# Patient Record
Sex: MALE | Race: WHITE | Employment: OTHER | URBAN - METROPOLITAN AREA
[De-identification: names, ages, dates, MRNs, and addresses within clinical notes are randomized per-mention and may not be internally consistent; named-entity substitution may affect disease eponyms.]

---

## 2017-01-12 RX ORDER — OMEPRAZOLE 40 MG/1
40 CAPSULE, DELAYED RELEASE ORAL
Qty: 90 CAPSULE | Refills: 0 | Status: SHIPPED | OUTPATIENT
Start: 2017-01-12 | End: 2017-08-05

## 2017-01-26 RX ORDER — LISINOPRIL 5 MG/1
TABLET ORAL
Qty: 90 TABLET | Refills: 0 | Status: SHIPPED | OUTPATIENT
Start: 2017-01-26 | End: 2017-04-23

## 2017-02-24 RX ORDER — SIMVASTATIN 40 MG
TABLET ORAL
Qty: 90 TABLET | Refills: 0 | Status: SHIPPED | OUTPATIENT
Start: 2017-02-24 | End: 2017-11-27

## 2017-03-06 RX ORDER — ALLOPURINOL 100 MG/1
TABLET ORAL
Qty: 90 TABLET | Refills: 0 | Status: SHIPPED | OUTPATIENT
Start: 2017-03-06 | End: 2017-06-01

## 2017-03-15 ENCOUNTER — LAB ENCOUNTER (OUTPATIENT)
Dept: LAB | Age: 82
End: 2017-03-15
Attending: FAMILY MEDICINE
Payer: MEDICARE

## 2017-03-15 DIAGNOSIS — M10.00 IDIOPATHIC GOUT, UNSPECIFIED CHRONICITY, UNSPECIFIED SITE: ICD-10-CM

## 2017-03-15 DIAGNOSIS — Z79.4 CONTROLLED TYPE 2 DIABETES MELLITUS WITHOUT COMPLICATION, WITH LONG-TERM CURRENT USE OF INSULIN (HCC): ICD-10-CM

## 2017-03-15 DIAGNOSIS — E55.9 VITAMIN D DEFICIENCY: ICD-10-CM

## 2017-03-15 DIAGNOSIS — E11.9 CONTROLLED TYPE 2 DIABETES MELLITUS WITHOUT COMPLICATION, WITH LONG-TERM CURRENT USE OF INSULIN (HCC): ICD-10-CM

## 2017-03-15 DIAGNOSIS — E78.00 PURE HYPERCHOLESTEROLEMIA: ICD-10-CM

## 2017-03-15 LAB
25-HYDROXYVITAMIN D (TOTAL): 41.1 NG/ML (ref 30–100)
ALBUMIN SERPL-MCNC: 3.5 G/DL (ref 3.5–4.8)
ALP LIVER SERPL-CCNC: 76 U/L (ref 45–117)
ALT SERPL-CCNC: 8 U/L (ref 17–63)
AST SERPL-CCNC: 18 U/L (ref 15–41)
BASOPHILS # BLD AUTO: 0.09 X10(3) UL (ref 0–0.1)
BASOPHILS NFR BLD AUTO: 0.9 %
BILIRUB SERPL-MCNC: 0.5 MG/DL (ref 0.1–2)
BUN BLD-MCNC: 16 MG/DL (ref 8–20)
CALCIUM BLD-MCNC: 9 MG/DL (ref 8.3–10.3)
CHLORIDE: 107 MMOL/L (ref 101–111)
CHOLEST SMN-MCNC: 126 MG/DL (ref ?–200)
CO2: 27 MMOL/L (ref 22–32)
CREAT BLD-MCNC: 1.16 MG/DL (ref 0.7–1.3)
CREAT UR-SCNC: 132 MG/DL
EOSINOPHIL # BLD AUTO: 0.18 X10(3) UL (ref 0–0.3)
EOSINOPHIL NFR BLD AUTO: 1.9 %
ERYTHROCYTE [DISTWIDTH] IN BLOOD BY AUTOMATED COUNT: 13.3 % (ref 11.5–16)
EST. AVERAGE GLUCOSE BLD GHB EST-MCNC: 131 MG/DL (ref 68–126)
GLUCOSE BLD-MCNC: 151 MG/DL (ref 70–99)
HBA1C MFR BLD HPLC: 6.2 % (ref ?–5.7)
HCT VFR BLD AUTO: 40.3 % (ref 37–53)
HDLC SERPL-MCNC: 48 MG/DL (ref 45–?)
HDLC SERPL: 2.63 {RATIO} (ref ?–4.97)
HGB BLD-MCNC: 13.5 G/DL (ref 13–17)
IMMATURE GRANULOCYTE COUNT: 0.08 X10(3) UL (ref 0–1)
IMMATURE GRANULOCYTE RATIO %: 0.8 %
LDLC SERPL CALC-MCNC: 51 MG/DL (ref ?–130)
LYMPHOCYTES # BLD AUTO: 3.22 X10(3) UL (ref 0.9–4)
LYMPHOCYTES NFR BLD AUTO: 33.3 %
M PROTEIN MFR SERPL ELPH: 6.8 G/DL (ref 6.1–8.3)
MCH RBC QN AUTO: 33 PG (ref 27–33.2)
MCHC RBC AUTO-ENTMCNC: 33.5 G/DL (ref 31–37)
MCV RBC AUTO: 98.5 FL (ref 80–99)
MICROALBUMIN UR-MCNC: 2.86 MG/DL
MICROALBUMIN/CREAT 24H UR-RTO: 21.7 UG/MG (ref ?–30)
MONOCYTES # BLD AUTO: 0.68 X10(3) UL (ref 0.1–0.6)
MONOCYTES NFR BLD AUTO: 7 %
NEUTROPHIL ABS PRELIM: 5.43 X10 (3) UL (ref 1.3–6.7)
NEUTROPHILS # BLD AUTO: 5.43 X10(3) UL (ref 1.3–6.7)
NEUTROPHILS NFR BLD AUTO: 56.1 %
NONHDLC SERPL-MCNC: 78 MG/DL (ref ?–130)
PLATELET # BLD AUTO: 245 10(3)UL (ref 150–450)
POTASSIUM SERPL-SCNC: 4.4 MMOL/L (ref 3.6–5.1)
RBC # BLD AUTO: 4.09 X10(6)UL (ref 3.8–5.8)
RED CELL DISTRIBUTION WIDTH-SD: 47.7 FL (ref 35.1–46.3)
SODIUM SERPL-SCNC: 142 MMOL/L (ref 136–144)
TRIGLYCERIDES: 135 MG/DL (ref ?–150)
URIC ACID: 4.9 MG/DL (ref 2.4–8.7)
VLDL: 27 MG/DL (ref 5–40)
WBC # BLD AUTO: 9.7 X10(3) UL (ref 4–13)

## 2017-03-15 PROCEDURE — 36415 COLL VENOUS BLD VENIPUNCTURE: CPT

## 2017-03-15 PROCEDURE — 85025 COMPLETE CBC W/AUTO DIFF WBC: CPT

## 2017-03-15 PROCEDURE — 83036 HEMOGLOBIN GLYCOSYLATED A1C: CPT

## 2017-03-15 PROCEDURE — 80061 LIPID PANEL: CPT

## 2017-03-15 PROCEDURE — 82306 VITAMIN D 25 HYDROXY: CPT

## 2017-03-15 PROCEDURE — 80053 COMPREHEN METABOLIC PANEL: CPT

## 2017-03-15 PROCEDURE — 82570 ASSAY OF URINE CREATININE: CPT

## 2017-03-15 PROCEDURE — 82043 UR ALBUMIN QUANTITATIVE: CPT

## 2017-03-15 PROCEDURE — 84550 ASSAY OF BLOOD/URIC ACID: CPT

## 2017-03-22 ENCOUNTER — OFFICE VISIT (OUTPATIENT)
Dept: FAMILY MEDICINE CLINIC | Facility: CLINIC | Age: 82
End: 2017-03-22

## 2017-03-22 VITALS
BODY MASS INDEX: 25.62 KG/M2 | OXYGEN SATURATION: 98 % | HEIGHT: 70 IN | HEART RATE: 78 BPM | SYSTOLIC BLOOD PRESSURE: 120 MMHG | DIASTOLIC BLOOD PRESSURE: 58 MMHG | TEMPERATURE: 98 F | RESPIRATION RATE: 16 BRPM | WEIGHT: 179 LBS

## 2017-03-22 DIAGNOSIS — Z00.00 ENCOUNTER FOR ANNUAL HEALTH EXAMINATION: Primary | ICD-10-CM

## 2017-03-22 DIAGNOSIS — E78.00 PURE HYPERCHOLESTEROLEMIA: ICD-10-CM

## 2017-03-22 DIAGNOSIS — M10.00 IDIOPATHIC GOUT, UNSPECIFIED CHRONICITY, UNSPECIFIED SITE: ICD-10-CM

## 2017-03-22 DIAGNOSIS — E53.8 B12 DEFICIENCY: ICD-10-CM

## 2017-03-22 DIAGNOSIS — E11.49 DIABETES MELLITUS TYPE 2 WITH NEUROLOGICAL MANIFESTATIONS (HCC): ICD-10-CM

## 2017-03-22 DIAGNOSIS — E55.9 VITAMIN D DEFICIENCY: ICD-10-CM

## 2017-03-22 PROCEDURE — 99214 OFFICE O/P EST MOD 30 MIN: CPT | Performed by: FAMILY MEDICINE

## 2017-03-22 PROCEDURE — G0439 PPPS, SUBSEQ VISIT: HCPCS | Performed by: FAMILY MEDICINE

## 2017-03-22 PROCEDURE — G0444 DEPRESSION SCREEN ANNUAL: HCPCS | Performed by: FAMILY MEDICINE

## 2017-03-22 RX ORDER — CARBIDOPA AND LEVODOPA 50; 200 MG/1; MG/1
1 TABLET, EXTENDED RELEASE ORAL 3 TIMES DAILY
Qty: 90 TABLET | Refills: 0 | COMMUNITY
Start: 2017-03-22 | End: 2017-03-22 | Stop reason: CLARIF

## 2017-03-22 NOTE — PATIENT INSTRUCTIONS
Continue current meds. Watch diet for fats and carbs. Stay active. Do fasting blood work one week prior next visit.   Prince Hadley's SCREENING SCHEDULE   Tests on this list are recommended by your physician but may not be covered, or covered at [facility] visit.  Limited to patients who meet one of the following criteria:   • Men who are 73-68 years old and have smoked more than 100 cigarettes in their lifetime   • Anyone with a family history    Colorectal Cancer Screening Covered up to Age 76     Colonosco or IX concentrates   Clients of institutions for the mentally retarded   Persons who live in the same house as a HepB virus carrier   Homosexual men   Illicit injectable drug abusers     Tetanus Toxoid- Only covered with a cut with metal- TD and TDaP Not c

## 2017-03-22 NOTE — PROGRESS NOTES
HPI:   Deannie Libman is a 80year old male who presents for a Medicare Subsequent Annual Wellness visit (Pt already had Initial Annual Wellness) also follow-up on diabetes hypertension hyperlipidemia neuropathy, gout, Parkinson's disease, vitamin D def disease)     LINDSAY on CPAP     Parkinson's disease ([facility] [address]     DDD (degenerative disc disease), lumbar     Asymmetrical right sensorineural hearing loss     Coronary artery disease involving native heart without angina pectoris     Idiopathic gout     Diabetes Take 81 mg by mouth daily. Polyethyl Glycol-Propyl Glycol (SYSTANE OP) Apply 1 drop to eye as needed. MEDICAL INFORMATION:   He  has a past medical history of Unspecified essential hypertension; Other and unspecified hyperlipidemia;  Type II or unspe from the following:    Height as of this encounter: 70\". Weight as of this encounter: 179 lb.     Medicare Hearing Assessment  (Required for AWV/SWV)    Finger Rub - normal      Visual Acuity                           General Appearance:  Alert, coopera presents for a Medicare Assessment. PLAN SUMMARY:   Diagnoses and all orders for this visit:    Encounter for annual health examination    Diabetes mellitus type 2 with neurological manifestations ([facility] [address]  -     COMP METABOLIC PANEL;  Future  -     HGB A1 do you take aspirin?: Yes    Have you had any immunizations at another office such as Influenza, Hepatitis B, Tetanus, or Pneumococcal?: No     Functional Ability     Bathing or Showering: Able without help    Toileting: Able without help    Dressing: Able test patient and document. Then, refresh your progress note to see your input here.   Cognitive Assessment     What day of the week is this?: Correct    What month is it?: Correct    What year is it?: Correct    Recall \"Ball\": Correct    Recall \"Flag\ found for this or any previous visit. Hepatitis B No orders found for this or any previous visit.      Tetanus   Orders placed or performed in visit on 01/07/15  -TETANUS, DIPHTHERIA TOXOIDS AND ACELLULAR PERTUSIS VACCINE (TDAP), >7 YEARS, IM USE

## 2017-03-24 ENCOUNTER — TELEPHONE (OUTPATIENT)
Dept: FAMILY MEDICINE CLINIC | Facility: CLINIC | Age: 82
End: 2017-03-24

## 2017-03-24 NOTE — TELEPHONE ENCOUNTER
Pt was seen by Dr. Nanette Banda on 3/22. At that [facility], pt completed one of our Medical Records Release of Information Forms requesting for his Medical Records from Susana Fitch MD w/[facility] (ph @ [phone], fax @ [phone]).   Request completed and faxed

## 2017-03-27 NOTE — TELEPHONE ENCOUNTER
16pgs of pt's requested Medical Records were rec'd via fax today from Dr. Derian dye/[facility]. Copies of all requested records were put in Dr. Myla Bojorquez in-box for review. Thank you.

## 2017-04-07 ENCOUNTER — MED REC SCAN ONLY (OUTPATIENT)
Dept: FAMILY MEDICINE CLINIC | Facility: CLINIC | Age: 82
End: 2017-04-07

## 2017-04-24 RX ORDER — LISINOPRIL 5 MG/1
TABLET ORAL
Qty: 90 TABLET | Refills: 0 | Status: SHIPPED | OUTPATIENT
Start: 2017-04-24 | End: 2017-06-11

## 2017-04-25 ENCOUNTER — TELEPHONE (OUTPATIENT)
Dept: FAMILY MEDICINE CLINIC | Facility: CLINIC | Age: 82
End: 2017-04-25

## 2017-04-25 NOTE — TELEPHONE ENCOUNTER
Please advice pt to contact his podiatrist and see if they could try different antibiotic.his podiatrist seen the wound and it will be easier for them to decide which is a good alternative. Thanks.

## 2017-04-25 NOTE — TELEPHONE ENCOUNTER
Outgoing call to patient, states he was prescribed Levaquin by Podiatrist Dr. Keaton Li, for an open wound on bottom of left foot that he has had for 2 weeks.   Concerned about taking this antibiotic because 2 of his children has Achilles tendon rupture af

## 2017-04-25 NOTE — TELEPHONE ENCOUNTER
Pt called stating he went to the podiatrist for a swollen left ankle and a wound that will not heal. He was given an antibiotic cream, and an oral antibiotic Levaquin.  Pt is concerned about the oral antibiotic because his sons had trouble with it in the pa

## 2017-05-23 RX ORDER — SIMVASTATIN 40 MG
TABLET ORAL
Qty: 90 TABLET | Refills: 0 | Status: SHIPPED | OUTPATIENT
Start: 2017-05-23 | End: 2017-09-20

## 2017-06-02 RX ORDER — ALLOPURINOL 100 MG/1
TABLET ORAL
Qty: 90 TABLET | Refills: 0 | Status: SHIPPED | OUTPATIENT
Start: 2017-06-02 | End: 2017-12-19

## 2017-06-12 RX ORDER — LISINOPRIL 5 MG/1
TABLET ORAL
Qty: 90 TABLET | Refills: 0 | Status: SHIPPED | OUTPATIENT
Start: 2017-06-12 | End: 2018-01-01

## 2017-06-21 ENCOUNTER — TELEPHONE (OUTPATIENT)
Dept: FAMILY MEDICINE CLINIC | Facility: CLINIC | Age: 82
End: 2017-06-21

## 2017-06-21 NOTE — TELEPHONE ENCOUNTER
Fax received from [address] [place] [address] requesting diabetic shoes for pt. Per Severino Lopez, prescription form (attached) and last OV notes from PCP (below) should be faxed over to Severino Lopez at [phone]. [facility] will measure and order shoes for pt.     Please co

## 2017-08-07 RX ORDER — OMEPRAZOLE 40 MG/1
CAPSULE, DELAYED RELEASE ORAL
Qty: 90 CAPSULE | Refills: 0 | Status: SHIPPED | OUTPATIENT
Start: 2017-08-07 | End: 2018-01-01

## 2017-08-21 RX ORDER — SIMVASTATIN 40 MG
TABLET ORAL
Qty: 90 TABLET | Refills: 0 | Status: SHIPPED | OUTPATIENT
Start: 2017-08-21 | End: 2017-09-20

## 2017-08-28 RX ORDER — ALLOPURINOL 100 MG/1
TABLET ORAL
Qty: 90 TABLET | Refills: 0 | Status: SHIPPED | OUTPATIENT
Start: 2017-08-28 | End: 2017-09-20

## 2017-09-13 ENCOUNTER — LABORATORY ENCOUNTER (OUTPATIENT)
Dept: LAB | Age: 82
End: 2017-09-13
Attending: FAMILY MEDICINE
Payer: MEDICARE

## 2017-09-13 DIAGNOSIS — E78.00 PURE HYPERCHOLESTEROLEMIA: ICD-10-CM

## 2017-09-13 DIAGNOSIS — E55.9 VITAMIN D DEFICIENCY: ICD-10-CM

## 2017-09-13 DIAGNOSIS — E53.8 B12 DEFICIENCY: ICD-10-CM

## 2017-09-13 DIAGNOSIS — M10.00 IDIOPATHIC GOUT, UNSPECIFIED CHRONICITY, UNSPECIFIED SITE: ICD-10-CM

## 2017-09-13 DIAGNOSIS — E11.49 DIABETES MELLITUS TYPE 2 WITH NEUROLOGICAL MANIFESTATIONS (HCC): ICD-10-CM

## 2017-09-13 LAB
25-HYDROXYVITAMIN D (TOTAL): 34.1 NG/ML (ref 30–100)
ALBUMIN SERPL-MCNC: 3.4 G/DL (ref 3.5–4.8)
ALP LIVER SERPL-CCNC: 95 U/L (ref 45–117)
ALT SERPL-CCNC: 9 U/L (ref 17–63)
AST SERPL-CCNC: 12 U/L (ref 15–41)
BASOPHILS # BLD AUTO: 0.07 X10(3) UL (ref 0–0.1)
BASOPHILS NFR BLD AUTO: 0.5 %
BILIRUB SERPL-MCNC: 0.6 MG/DL (ref 0.1–2)
BUN BLD-MCNC: 13 MG/DL (ref 8–20)
CALCIUM BLD-MCNC: 9.2 MG/DL (ref 8.3–10.3)
CHLORIDE: 104 MMOL/L (ref 101–111)
CHOLEST SMN-MCNC: 131 MG/DL (ref ?–200)
CO2: 26 MMOL/L (ref 22–32)
CREAT BLD-MCNC: 1.2 MG/DL (ref 0.7–1.3)
CREAT UR-SCNC: 98.4 MG/DL
EOSINOPHIL # BLD AUTO: 0.1 X10(3) UL (ref 0–0.3)
EOSINOPHIL NFR BLD AUTO: 0.7 %
ERYTHROCYTE [DISTWIDTH] IN BLOOD BY AUTOMATED COUNT: 13.3 % (ref 11.5–16)
EST. AVERAGE GLUCOSE BLD GHB EST-MCNC: 146 MG/DL (ref 68–126)
GLUCOSE BLD-MCNC: 121 MG/DL (ref 70–99)
HAV AB SERPL IA-ACNC: 606 PG/ML (ref 193–986)
HBA1C MFR BLD HPLC: 6.7 % (ref ?–5.7)
HCT VFR BLD AUTO: 41 % (ref 37–53)
HDLC SERPL-MCNC: 58 MG/DL (ref 45–?)
HDLC SERPL: 2.26 {RATIO} (ref ?–4.97)
HGB BLD-MCNC: 13.3 G/DL (ref 13–17)
IMMATURE GRANULOCYTE COUNT: 0.08 X10(3) UL (ref 0–1)
IMMATURE GRANULOCYTE RATIO %: 0.6 %
LDLC SERPL CALC-MCNC: 52 MG/DL (ref ?–130)
LDLC SERPL-MCNC: 21 MG/DL (ref 5–40)
LYMPHOCYTES # BLD AUTO: 4.04 X10(3) UL (ref 0.9–4)
LYMPHOCYTES NFR BLD AUTO: 30.1 %
M PROTEIN MFR SERPL ELPH: 7.1 G/DL (ref 6.1–8.3)
MCH RBC QN AUTO: 32 PG (ref 27–33.2)
MCHC RBC AUTO-ENTMCNC: 32.4 G/DL (ref 31–37)
MCV RBC AUTO: 98.6 FL (ref 80–99)
MICROALBUMIN UR-MCNC: 4.28 MG/DL
MICROALBUMIN/CREAT 24H UR-RTO: 43.5 UG/MG (ref ?–30)
MONOCYTES # BLD AUTO: 1.33 X10(3) UL (ref 0.1–0.6)
MONOCYTES NFR BLD AUTO: 9.9 %
NEUTROPHIL ABS PRELIM: 7.78 X10 (3) UL (ref 1.3–6.7)
NEUTROPHILS # BLD AUTO: 7.78 X10(3) UL (ref 1.3–6.7)
NEUTROPHILS NFR BLD AUTO: 58.2 %
NONHDLC SERPL-MCNC: 73 MG/DL (ref ?–130)
PLATELET # BLD AUTO: 246 10(3)UL (ref 150–450)
POTASSIUM SERPL-SCNC: 4.6 MMOL/L (ref 3.6–5.1)
RBC # BLD AUTO: 4.16 X10(6)UL (ref 3.8–5.8)
RED CELL DISTRIBUTION WIDTH-SD: 48.3 FL (ref 35.1–46.3)
SODIUM SERPL-SCNC: 139 MMOL/L (ref 136–144)
TRIGLYCERIDES: 107 MG/DL (ref ?–150)
WBC # BLD AUTO: 13.4 X10(3) UL (ref 4–13)

## 2017-09-13 PROCEDURE — 82043 UR ALBUMIN QUANTITATIVE: CPT

## 2017-09-13 PROCEDURE — 80053 COMPREHEN METABOLIC PANEL: CPT

## 2017-09-13 PROCEDURE — 82306 VITAMIN D 25 HYDROXY: CPT

## 2017-09-13 PROCEDURE — 80061 LIPID PANEL: CPT

## 2017-09-13 PROCEDURE — 36415 COLL VENOUS BLD VENIPUNCTURE: CPT

## 2017-09-13 PROCEDURE — 85025 COMPLETE CBC W/AUTO DIFF WBC: CPT

## 2017-09-13 PROCEDURE — 82607 VITAMIN B-12: CPT

## 2017-09-13 PROCEDURE — 83036 HEMOGLOBIN GLYCOSYLATED A1C: CPT

## 2017-09-13 PROCEDURE — 82570 ASSAY OF URINE CREATININE: CPT

## 2017-09-20 ENCOUNTER — LAB ENCOUNTER (OUTPATIENT)
Dept: LAB | Age: 82
End: 2017-09-20
Attending: FAMILY MEDICINE
Payer: MEDICARE

## 2017-09-20 ENCOUNTER — HOSPITAL ENCOUNTER (OUTPATIENT)
Dept: GENERAL RADIOLOGY | Age: 82
Discharge: HOME | End: 2017-09-20
Attending: ORTHOPAEDIC SURGERY
Payer: MEDICARE

## 2017-09-20 ENCOUNTER — OFFICE VISIT (OUTPATIENT)
Dept: FAMILY MEDICINE CLINIC | Facility: CLINIC | Age: 82
End: 2017-09-20

## 2017-09-20 VITALS
BODY MASS INDEX: 24.91 KG/M2 | TEMPERATURE: 96 F | RESPIRATION RATE: 14 BRPM | HEART RATE: 71 BPM | DIASTOLIC BLOOD PRESSURE: 68 MMHG | WEIGHT: 174 LBS | SYSTOLIC BLOOD PRESSURE: 120 MMHG | HEIGHT: 70 IN

## 2017-09-20 DIAGNOSIS — K21.9 GASTROESOPHAGEAL REFLUX DISEASE WITHOUT ESOPHAGITIS: ICD-10-CM

## 2017-09-20 DIAGNOSIS — E55.9 VITAMIN D DEFICIENCY: ICD-10-CM

## 2017-09-20 DIAGNOSIS — E53.8 VITAMIN B 12 DEFICIENCY: ICD-10-CM

## 2017-09-20 DIAGNOSIS — E11.49 DIABETES MELLITUS TYPE 2 WITH NEUROLOGICAL MANIFESTATIONS (HCC): Primary | ICD-10-CM

## 2017-09-20 DIAGNOSIS — D72.829 LEUKOCYTOSIS, UNSPECIFIED TYPE: ICD-10-CM

## 2017-09-20 DIAGNOSIS — E78.00 PURE HYPERCHOLESTEROLEMIA: ICD-10-CM

## 2017-09-20 DIAGNOSIS — I25.10 CORONARY ARTERY DISEASE INVOLVING NATIVE CORONARY ARTERY OF NATIVE HEART WITHOUT ANGINA PECTORIS: ICD-10-CM

## 2017-09-20 DIAGNOSIS — R39.12 BENIGN PROSTATIC HYPERPLASIA WITH WEAK URINARY STREAM: ICD-10-CM

## 2017-09-20 DIAGNOSIS — M10.00 IDIOPATHIC GOUT, UNSPECIFIED CHRONICITY, UNSPECIFIED SITE: ICD-10-CM

## 2017-09-20 DIAGNOSIS — L03.90 CELLULITIS: ICD-10-CM

## 2017-09-20 DIAGNOSIS — R60.0 LEG EDEMA, LEFT: ICD-10-CM

## 2017-09-20 DIAGNOSIS — E08.42 DIABETIC POLYNEUROPATHY ASSOCIATED WITH DIABETES MELLITUS DUE TO UNDERLYING CONDITION (HCC): ICD-10-CM

## 2017-09-20 DIAGNOSIS — F43.9 STRESS: ICD-10-CM

## 2017-09-20 DIAGNOSIS — N40.1 BENIGN PROSTATIC HYPERPLASIA WITH WEAK URINARY STREAM: ICD-10-CM

## 2017-09-20 DIAGNOSIS — L03.116 CELLULITIS OF LEFT FOOT: ICD-10-CM

## 2017-09-20 DIAGNOSIS — G20 PARKINSON'S DISEASE (HCC): ICD-10-CM

## 2017-09-20 DIAGNOSIS — I10 HTN (HYPERTENSION), BENIGN: ICD-10-CM

## 2017-09-20 DIAGNOSIS — Z95.5 H/O HEART ARTERY STENT: ICD-10-CM

## 2017-09-20 LAB
BASOPHILS # BLD AUTO: 0.08 X10(3) UL (ref 0–0.1)
BASOPHILS NFR BLD AUTO: 0.7 %
EOSINOPHIL # BLD AUTO: 0.25 X10(3) UL (ref 0–0.3)
EOSINOPHIL NFR BLD AUTO: 2.2 %
ERYTHROCYTE [DISTWIDTH] IN BLOOD BY AUTOMATED COUNT: 13 % (ref 11.5–16)
HCT VFR BLD AUTO: 39.7 % (ref 37–53)
HGB BLD-MCNC: 12.7 G/DL (ref 13–17)
IMMATURE GRANULOCYTE COUNT: 0.04 X10(3) UL (ref 0–1)
IMMATURE GRANULOCYTE RATIO %: 0.4 %
LYMPHOCYTES # BLD AUTO: 3.29 X10(3) UL (ref 0.9–4)
LYMPHOCYTES NFR BLD AUTO: 29.3 %
MCH RBC QN AUTO: 31.4 PG (ref 27–33.2)
MCHC RBC AUTO-ENTMCNC: 32 G/DL (ref 31–37)
MCV RBC AUTO: 98.3 FL (ref 80–99)
MONOCYTES # BLD AUTO: 1 X10(3) UL (ref 0.1–0.6)
MONOCYTES NFR BLD AUTO: 8.9 %
NEUTROPHIL ABS PRELIM: 6.58 X10 (3) UL (ref 1.3–6.7)
NEUTROPHILS # BLD AUTO: 6.58 X10(3) UL (ref 1.3–6.7)
NEUTROPHILS NFR BLD AUTO: 58.5 %
PLATELET # BLD AUTO: 333 10(3)UL (ref 150–450)
RBC # BLD AUTO: 4.04 X10(6)UL (ref 3.8–5.8)
RED CELL DISTRIBUTION WIDTH-SD: 46.3 FL (ref 35.1–46.3)
URIC ACID: 4.7 MG/DL (ref 2.4–8.7)
WBC # BLD AUTO: 11.2 X10(3) UL (ref 4–13)

## 2017-09-20 PROCEDURE — 99215 OFFICE O/P EST HI 40 MIN: CPT | Performed by: FAMILY MEDICINE

## 2017-09-20 PROCEDURE — 36415 COLL VENOUS BLD VENIPUNCTURE: CPT

## 2017-09-20 PROCEDURE — 85025 COMPLETE CBC W/AUTO DIFF WBC: CPT

## 2017-09-20 PROCEDURE — 84550 ASSAY OF BLOOD/URIC ACID: CPT

## 2017-09-20 PROCEDURE — 73630 X-RAY EXAM OF FOOT: CPT | Performed by: ORTHOPAEDIC SURGERY

## 2017-09-20 RX ORDER — CEPHALEXIN 500 MG/1
500 CAPSULE ORAL 3 TIMES DAILY
COMMUNITY
End: 2018-01-01 | Stop reason: ALTCHOICE

## 2017-09-20 RX ORDER — ALUMINUM ZIRCONIUM OCTACHLOROHYDREX GLY 16 G/100G
1 GEL TOPICAL DAILY
COMMUNITY

## 2017-09-20 NOTE — PATIENT INSTRUCTIONS
Continue current medications. Continue antibiotic. Continue probiotic. Follow-up in 1 week with podiatrist.  Healthy diet  We will check uric acid today. Do blood work before next visit.

## 2017-09-20 NOTE — PROGRESS NOTES
Faisal Alexander is a 80year old male. CC diabetes, hypertension, hyperlipidemia, gout, Parkinson's, neuropathy, infection of the left food, stress, coronary artery disease, GERD,   HPI:   Diabetes patient is taking medications regularly.   Hemoglobin A1c not related to infection below the food. It is warm to touch red. He started Keflex yesterday and it seems to be slightly better today. Still having swelling of the bilateral lower legs especially on the left side.  No fFever no chills no generalized sym tablets by mouth daily. Disp:  Rfl:    Carbidopa-Levodopa ER (SINEMET CR)  MG Oral Tab CR Take 2 tablets by mouth nightly. Disp:  Rfl:    aspirin 81 MG Oral Tab Take 81 mg by mouth daily.  Disp:  Rfl:    Polyethyl Glycol-Propyl Glycol (SYSTANE OP) murmur  GI: good BS's,no masses, HSM or tenderness  EXTREMITIES: no cyanosis, clubbing, 1 edema left lower leg and foot, there is trace edema of the right lower leg   Patient had foot examination done with bilateral shoes and socks off.    Patient has redne -MICROALB/CREAT RATIO, RANDOM URINE   Result Value Ref Range   Microalbumin, Urine 4.28 mg/dL   Creatinine Ur Random 98.40 mg/dL   Malb/Cre Calc 43.5 (H) <=30.0 ug/mg   -CBC W/ DIFFERENTIAL   Result Value Ref Range   WBC 13.4 (H) 4.0 - 13.0 x10(3) uL   R RANDOM URINE      VITAMIN D, 25-HYDROXY      VITAMIN B12      Urinalysis, Routine      URIC ACID, SERUM    Meds & Refills for this Visit:    No prescriptions requested or ordered in this encounter     Continue current medications. Continue antibiotic.   C

## 2017-09-27 RX ORDER — MECLIZINE HCL 12.5 MG/1
12.5 TABLET ORAL 3 TIMES DAILY PRN
Qty: 30 TABLET | Refills: 0 | Status: SHIPPED | OUTPATIENT
Start: 2017-09-27 | End: 2018-01-01

## 2017-09-27 NOTE — TELEPHONE ENCOUNTER
See below. Last fill 4/2016  Pt seen 9/20 for meds. Seeing you again in [place]. Please approve if appropriate. Thanks.

## 2017-09-27 NOTE — TELEPHONE ENCOUNTER
Pt states that he called the Pharmacy and they stated that his prescirption was old and . He uses it as need for vertigo.   He is requesting a refill of Meclizine 12.5mg 1 tablet 3x daily as needed sent [facility] [address] [place] [address] - [address]

## 2017-10-31 RX ORDER — LISINOPRIL 5 MG/1
TABLET ORAL
Qty: 90 TABLET | Refills: 0 | Status: SHIPPED | OUTPATIENT
Start: 2017-10-31 | End: 2018-01-01

## 2017-11-02 RX ORDER — OMEPRAZOLE 40 MG/1
CAPSULE, DELAYED RELEASE ORAL
Qty: 90 CAPSULE | Refills: 0 | Status: SHIPPED | OUTPATIENT
Start: 2017-11-02 | End: 2018-01-01

## 2017-11-06 ENCOUNTER — TELEPHONE (OUTPATIENT)
Dept: FAMILY MEDICINE CLINIC | Facility: CLINIC | Age: 82
End: 2017-11-06

## 2017-11-06 NOTE — TELEPHONE ENCOUNTER
Call back to reno/daughter reaches identified voice mail. Left vmm advising I will call back again shortly for update from silverio.

## 2017-11-06 NOTE — TELEPHONE ENCOUNTER
[phone] call back from reno/daughter-shelley she just spoke with clinical staff at [facility] who told her their  spoke to pt earlier today, but only in regard to pt's [facility], as insurance will no longer co

## 2017-11-06 NOTE — TELEPHONE ENCOUNTER
Spoke with reno/daughter-listed on hipaa consent-ists just received call from her brother who reports just spoke with pt who is reportedly been increasingly depressed and is talking about killing himself by either taking none of his meds or taking all of understanding, agrees with plan, sts she will call [facility] now. Advised to call me back as soon as she obtains info from [facility]. Advised dr Foster Model is expected in ofc any time now-will update her with above info and plan as soon as she arrives.

## 2017-11-06 NOTE — TELEPHONE ENCOUNTER
Call to [facility] quinton/administrative resources/silverio. Maxi Barba reported she spoke with pt herself.    Reportedly pt voiced to her about being upset after visiting a friend of his there that was moved to a higher level of care because of worsening maria m

## 2017-11-07 NOTE — TELEPHONE ENCOUNTER
Gamal Rogers called back to update--    Pt is behavior wise stable, no suicidal ideation, not combative   Discussed plan of care with Bethany Guillermina, includes--  Daily wellness check (more personalized) to assess and evaluate what he needs   Pt will see n

## 2017-11-08 NOTE — TELEPHONE ENCOUNTER
Updated dragan RODRIGUES yesterday with info/plan noted below since initial call to our ofc 11/6/17. She agrees with plans. Call to marco/daughter for contact info for [facility].   Confirms [facility] location for pt is [place]/independent living and ha calling re yohannes mendez. Informed I am out of the ofc tomorrow, so will call her back Friday 11/10. Update to dr Arian Arenas and dragan.

## 2017-11-15 NOTE — TELEPHONE ENCOUNTER
Call back from kwan .  sts confirms during her initial visit with pt last wk, she advised pt his family notified her of concerns regarding his statements and reported plan to harm himself. sts pt readily admitted making

## 2017-11-15 NOTE — TELEPHONE ENCOUNTER
Attempts to reach [place]/silverio  since 11/1017 have reached only voice mail. Call again reaches same-left [facility] req call back to me today with update on pt. Provided ofc phone hours.

## 2017-11-16 ENCOUNTER — MED REC SCAN ONLY (OUTPATIENT)
Dept: FAMILY MEDICINE CLINIC | Facility: CLINIC | Age: 82
End: 2017-11-16

## 2017-11-27 RX ORDER — SIMVASTATIN 40 MG
TABLET ORAL
Qty: 90 TABLET | Refills: 0 | Status: SHIPPED | OUTPATIENT
Start: 2017-11-27 | End: 2018-01-01

## 2017-11-27 NOTE — TELEPHONE ENCOUNTER
Pt switched pharmacy to:    [facility], [address] [place] [address] Mariaelena Jose Eduardo [phone], [phone]    Need refills sent to them for   METFORMIN  MG Oral Tab  SIMVASTATIN 40 MG Oral Tab

## 2017-12-19 RX ORDER — ALLOPURINOL 100 MG/1
100 TABLET ORAL
Qty: 90 TABLET | Refills: 1 | Status: SHIPPED | OUTPATIENT
Start: 2017-12-19 | End: 2018-01-01

## 2017-12-19 NOTE — TELEPHONE ENCOUNTER
NEED REFILL ALLOPURINOL 100 MG Oral Tab AT [facility] ON [address] DAYS SUPPLY   PATIENT IS ALMOST OUT OF IT

## 2017-12-19 NOTE — TELEPHONE ENCOUNTER
Last med visit 9/20/17-advised to follow up 1/2018. Pt has appt scheduled for 1/17/2018  Med last ordered 8/28/17 #90 no RF.  **see med order pended for review** thanks!

## 2018-01-01 ENCOUNTER — LAB ENCOUNTER (OUTPATIENT)
Dept: LAB | Age: 83
End: 2018-01-01
Attending: FAMILY MEDICINE
Payer: MEDICARE

## 2018-01-01 ENCOUNTER — TELEPHONE (OUTPATIENT)
Dept: FAMILY MEDICINE CLINIC | Facility: CLINIC | Age: 83
End: 2018-01-01

## 2018-01-01 ENCOUNTER — OFFICE VISIT (OUTPATIENT)
Dept: FAMILY MEDICINE CLINIC | Facility: CLINIC | Age: 83
End: 2018-01-01
Payer: MEDICARE

## 2018-01-01 ENCOUNTER — OFFICE VISIT (OUTPATIENT)
Dept: FAMILY MEDICINE CLINIC | Facility: CLINIC | Age: 83
End: 2018-01-01

## 2018-01-01 ENCOUNTER — TELEPHONE (OUTPATIENT)
Dept: HEMATOLOGY/ONCOLOGY | Facility: HOSPITAL | Age: 83
End: 2018-01-01

## 2018-01-01 ENCOUNTER — APPOINTMENT (OUTPATIENT)
Dept: CT IMAGING | Facility: HOSPITAL | Age: 83
DRG: 542 | End: 2018-01-01
Attending: INTERNAL MEDICINE
Payer: MEDICARE

## 2018-01-01 ENCOUNTER — HOSPITAL ENCOUNTER (INPATIENT)
Facility: HOSPITAL | Age: 83
LOS: 5 days | Discharge: NURSING, REHAB OR LONG-TERM CARE | DRG: 542 | End: 2018-01-01
Attending: EMERGENCY MEDICINE | Admitting: HOSPITALIST
Payer: MEDICARE

## 2018-01-01 ENCOUNTER — LABORATORY ENCOUNTER (OUTPATIENT)
Dept: LAB | Age: 83
End: 2018-01-01
Attending: FAMILY MEDICINE
Payer: MEDICARE

## 2018-01-01 ENCOUNTER — APPOINTMENT (OUTPATIENT)
Dept: CT IMAGING | Facility: HOSPITAL | Age: 83
DRG: 542 | End: 2018-01-01
Attending: PHYSICIAN ASSISTANT
Payer: MEDICARE

## 2018-01-01 ENCOUNTER — NURSE ONLY (OUTPATIENT)
Dept: FAMILY MEDICINE CLINIC | Facility: CLINIC | Age: 83
End: 2018-01-01

## 2018-01-01 ENCOUNTER — HOSPITAL ENCOUNTER (OUTPATIENT)
Dept: GENERAL RADIOLOGY | Age: 83
Discharge: HOME | End: 2018-01-01
Attending: FAMILY MEDICINE
Payer: MEDICARE

## 2018-01-01 ENCOUNTER — APPOINTMENT (OUTPATIENT)
Dept: NUCLEAR MEDICINE | Facility: HOSPITAL | Age: 83
DRG: 542 | End: 2018-01-01
Attending: INTERNAL MEDICINE
Payer: MEDICARE

## 2018-01-01 ENCOUNTER — NURSE ONLY (OUTPATIENT)
Dept: LAB | Age: 83
End: 2018-01-01
Attending: FAMILY MEDICINE
Payer: MEDICARE

## 2018-01-01 ENCOUNTER — APPOINTMENT (OUTPATIENT)
Dept: GENERAL RADIOLOGY | Facility: HOSPITAL | Age: 83
DRG: 542 | End: 2018-01-01
Attending: PHYSICIAN ASSISTANT
Payer: MEDICARE

## 2018-01-01 VITALS
RESPIRATION RATE: 16 BRPM | BODY MASS INDEX: 24.62 KG/M2 | TEMPERATURE: 98 F | HEIGHT: 70 IN | SYSTOLIC BLOOD PRESSURE: 110 MMHG | WEIGHT: 172 LBS | HEART RATE: 82 BPM | DIASTOLIC BLOOD PRESSURE: 64 MMHG

## 2018-01-01 VITALS
BODY MASS INDEX: 24.05 KG/M2 | WEIGHT: 168 LBS | HEART RATE: 74 BPM | RESPIRATION RATE: 16 BRPM | HEIGHT: 70 IN | TEMPERATURE: 98 F | SYSTOLIC BLOOD PRESSURE: 104 MMHG | DIASTOLIC BLOOD PRESSURE: 60 MMHG

## 2018-01-01 VITALS
RESPIRATION RATE: 18 BRPM | OXYGEN SATURATION: 92 % | DIASTOLIC BLOOD PRESSURE: 87 MMHG | TEMPERATURE: 98 F | WEIGHT: 158.88 LBS | HEART RATE: 68 BPM | BODY MASS INDEX: 22.24 KG/M2 | SYSTOLIC BLOOD PRESSURE: 157 MMHG | HEIGHT: 71 IN

## 2018-01-01 VITALS
HEART RATE: 74 BPM | TEMPERATURE: 97 F | DIASTOLIC BLOOD PRESSURE: 80 MMHG | WEIGHT: 168 LBS | SYSTOLIC BLOOD PRESSURE: 132 MMHG | BODY MASS INDEX: 24.05 KG/M2 | HEIGHT: 70 IN | RESPIRATION RATE: 14 BRPM

## 2018-01-01 VITALS
WEIGHT: 163 LBS | HEART RATE: 83 BPM | HEIGHT: 70 IN | BODY MASS INDEX: 23.34 KG/M2 | TEMPERATURE: 97 F | RESPIRATION RATE: 14 BRPM | SYSTOLIC BLOOD PRESSURE: 90 MMHG | DIASTOLIC BLOOD PRESSURE: 60 MMHG

## 2018-01-01 DIAGNOSIS — I10 HTN (HYPERTENSION), BENIGN: ICD-10-CM

## 2018-01-01 DIAGNOSIS — G20 PARKINSON DISEASE (HCC): Primary | ICD-10-CM

## 2018-01-01 DIAGNOSIS — E53.8 VITAMIN B 12 DEFICIENCY: ICD-10-CM

## 2018-01-01 DIAGNOSIS — E11.49 DIABETES MELLITUS TYPE 2 WITH NEUROLOGICAL MANIFESTATIONS (HCC): ICD-10-CM

## 2018-01-01 DIAGNOSIS — R07.89 CHEST WALL PAIN: ICD-10-CM

## 2018-01-01 DIAGNOSIS — G20 PARKINSON'S DISEASE (HCC): ICD-10-CM

## 2018-01-01 DIAGNOSIS — E78.00 PURE HYPERCHOLESTEROLEMIA: ICD-10-CM

## 2018-01-01 DIAGNOSIS — M25.511 ACUTE PAIN OF BOTH SHOULDERS: ICD-10-CM

## 2018-01-01 DIAGNOSIS — Z99.89 OSA ON CPAP: ICD-10-CM

## 2018-01-01 DIAGNOSIS — K21.9 GASTROESOPHAGEAL REFLUX DISEASE WITHOUT ESOPHAGITIS: ICD-10-CM

## 2018-01-01 DIAGNOSIS — F43.9 STRESS: ICD-10-CM

## 2018-01-01 DIAGNOSIS — R06.89 TROUBLE BREATHING: ICD-10-CM

## 2018-01-01 DIAGNOSIS — D64.9 ANEMIA, UNSPECIFIED TYPE: ICD-10-CM

## 2018-01-01 DIAGNOSIS — M25.512 ACUTE PAIN OF BOTH SHOULDERS: ICD-10-CM

## 2018-01-01 DIAGNOSIS — G47.33 OSA ON CPAP: ICD-10-CM

## 2018-01-01 DIAGNOSIS — M25.512 ACUTE PAIN OF BOTH SHOULDERS: Primary | ICD-10-CM

## 2018-01-01 DIAGNOSIS — I25.10 CORONARY ARTERY DISEASE INVOLVING NATIVE CORONARY ARTERY OF NATIVE HEART WITHOUT ANGINA PECTORIS: ICD-10-CM

## 2018-01-01 DIAGNOSIS — E08.42 DIABETIC POLYNEUROPATHY ASSOCIATED WITH DIABETES MELLITUS DUE TO UNDERLYING CONDITION (HCC): ICD-10-CM

## 2018-01-01 DIAGNOSIS — E55.9 VITAMIN D DEFICIENCY: ICD-10-CM

## 2018-01-01 DIAGNOSIS — C79.51 METASTATIC CANCER TO SPINE (HCC): Primary | ICD-10-CM

## 2018-01-01 DIAGNOSIS — Z11.1 TUBERCULOSIS SCREENING: Primary | ICD-10-CM

## 2018-01-01 DIAGNOSIS — Z66 DNR (DO NOT RESUSCITATE): ICD-10-CM

## 2018-01-01 DIAGNOSIS — M25.511 ACUTE PAIN OF BOTH SHOULDERS: Primary | ICD-10-CM

## 2018-01-01 DIAGNOSIS — R53.1 GENERALIZED WEAKNESS: ICD-10-CM

## 2018-01-01 DIAGNOSIS — D72.829 LEUKOCYTOSIS, UNSPECIFIED TYPE: ICD-10-CM

## 2018-01-01 DIAGNOSIS — D64.9 ANEMIA, UNSPECIFIED TYPE: Primary | ICD-10-CM

## 2018-01-01 DIAGNOSIS — I10 HTN (HYPERTENSION), BENIGN: Primary | ICD-10-CM

## 2018-01-01 DIAGNOSIS — C79.51 BONE METASTASES (HCC): ICD-10-CM

## 2018-01-01 DIAGNOSIS — Z00.00 MEDICARE ANNUAL WELLNESS VISIT, SUBSEQUENT: Primary | ICD-10-CM

## 2018-01-01 DIAGNOSIS — R53.1 WEAKNESS GENERALIZED: ICD-10-CM

## 2018-01-01 DIAGNOSIS — E53.8 VITAMIN B12 DEFICIENCY: ICD-10-CM

## 2018-01-01 DIAGNOSIS — M10.00 IDIOPATHIC GOUT, UNSPECIFIED CHRONICITY, UNSPECIFIED SITE: ICD-10-CM

## 2018-01-01 DIAGNOSIS — R26.89 BALANCE PROBLEM: ICD-10-CM

## 2018-01-01 DIAGNOSIS — E11.49 DIABETES MELLITUS TYPE 2 WITH NEUROLOGICAL MANIFESTATIONS (HCC): Primary | ICD-10-CM

## 2018-01-01 DIAGNOSIS — Z79.4 DIABETES MELLITUS DUE TO UNDERLYING CONDITION WITH DIABETIC POLYNEUROPATHY, WITH LONG-TERM CURRENT USE OF INSULIN (HCC): ICD-10-CM

## 2018-01-01 DIAGNOSIS — Z99.89 CPAP (CONTINUOUS POSITIVE AIRWAY PRESSURE) DEPENDENCE: ICD-10-CM

## 2018-01-01 DIAGNOSIS — K59.00 CONSTIPATION, UNSPECIFIED CONSTIPATION TYPE: ICD-10-CM

## 2018-01-01 DIAGNOSIS — G47.33 OSA (OBSTRUCTIVE SLEEP APNEA): ICD-10-CM

## 2018-01-01 DIAGNOSIS — R69 DIAGNOSIS UNKNOWN: Primary | ICD-10-CM

## 2018-01-01 DIAGNOSIS — H91.13 PRESBYCUSIS OF BOTH EARS: ICD-10-CM

## 2018-01-01 DIAGNOSIS — E08.42 DIABETES MELLITUS DUE TO UNDERLYING CONDITION WITH DIABETIC POLYNEUROPATHY, WITH LONG-TERM CURRENT USE OF INSULIN (HCC): ICD-10-CM

## 2018-01-01 DIAGNOSIS — Z95.5 H/O HEART ARTERY STENT: ICD-10-CM

## 2018-01-01 LAB
25-HYDROXYVITAMIN D (TOTAL): 35.2 NG/ML (ref 30–100)
ALBUMIN SERPL-MCNC: 3.7 G/DL (ref 3.5–4.8)
ALP LIVER SERPL-CCNC: 84 U/L (ref 45–117)
ALT SERPL-CCNC: 7 U/L (ref 17–63)
AST SERPL-CCNC: 16 U/L (ref 15–41)
BASOPHILS # BLD AUTO: 0.06 X10(3) UL (ref 0–0.1)
BASOPHILS # BLD AUTO: 0.12 X10(3) UL (ref 0–0.1)
BASOPHILS NFR BLD AUTO: 0.6 %
BASOPHILS NFR BLD AUTO: 1 %
BILIRUB SERPL-MCNC: 0.6 MG/DL (ref 0.1–2)
BILIRUB UR QL STRIP.AUTO: NEGATIVE
BUN BLD-MCNC: 18 MG/DL (ref 8–20)
C-REACTIVE PROTEIN: 1.88 MG/DL (ref ?–1)
CALCIUM BLD-MCNC: 9.5 MG/DL (ref 8.3–10.3)
CHLORIDE: 105 MMOL/L (ref 101–111)
CHOLEST SMN-MCNC: 137 MG/DL (ref ?–200)
CLARITY UR REFRACT.AUTO: CLEAR
CO2: 24 MMOL/L (ref 22–32)
COLOR UR AUTO: YELLOW
CREAT BLD-MCNC: 1.15 MG/DL (ref 0.7–1.3)
CREAT UR-SCNC: 140 MG/DL
EOSINOPHIL # BLD AUTO: 0.27 X10(3) UL (ref 0–0.3)
EOSINOPHIL # BLD AUTO: 0.53 X10(3) UL (ref 0–0.3)
EOSINOPHIL NFR BLD AUTO: 2.2 %
EOSINOPHIL NFR BLD AUTO: 5 %
ERYTHROCYTE [DISTWIDTH] IN BLOOD BY AUTOMATED COUNT: 13.2 % (ref 11.5–16)
ERYTHROCYTE [DISTWIDTH] IN BLOOD BY AUTOMATED COUNT: 13.7 % (ref 11.5–16)
EST. AVERAGE GLUCOSE BLD GHB EST-MCNC: 143 MG/DL (ref 68–126)
GLUCOSE BLD-MCNC: 100 MG/DL (ref 70–99)
GLUCOSE UR STRIP.AUTO-MCNC: NEGATIVE MG/DL
HAV AB SERPL IA-ACNC: 1099 PG/ML (ref 193–986)
HBA1C MFR BLD HPLC: 6.6 % (ref ?–5.7)
HCT VFR BLD AUTO: 40.1 % (ref 37–53)
HCT VFR BLD AUTO: 42.6 % (ref 37–53)
HDLC SERPL-MCNC: 51 MG/DL (ref 45–?)
HDLC SERPL: 2.69 {RATIO} (ref ?–4.97)
HGB BLD-MCNC: 12.8 G/DL (ref 13–17)
HGB BLD-MCNC: 13.7 G/DL (ref 13–17)
IMMATURE GRANULOCYTE COUNT: 0.03 X10(3) UL (ref 0–1)
IMMATURE GRANULOCYTE COUNT: 0.04 X10(3) UL (ref 0–1)
IMMATURE GRANULOCYTE RATIO %: 0.2 %
IMMATURE GRANULOCYTE RATIO %: 0.4 %
LDLC SERPL CALC-MCNC: 59 MG/DL (ref ?–130)
LEUKOCYTE ESTERASE UR QL STRIP.AUTO: NEGATIVE
LYMPHOCYTES # BLD AUTO: 3.24 X10(3) UL (ref 0.9–4)
LYMPHOCYTES # BLD AUTO: 4.24 X10(3) UL (ref 0.9–4)
LYMPHOCYTES NFR BLD AUTO: 30.4 %
LYMPHOCYTES NFR BLD AUTO: 33.9 %
M PROTEIN MFR SERPL ELPH: 7.2 G/DL (ref 6.1–8.3)
MCH RBC QN AUTO: 31.2 PG (ref 27–33.2)
MCH RBC QN AUTO: 31.7 PG (ref 27–33.2)
MCHC RBC AUTO-ENTMCNC: 31.9 G/DL (ref 31–37)
MCHC RBC AUTO-ENTMCNC: 32.2 G/DL (ref 31–37)
MCV RBC AUTO: 97 FL (ref 80–99)
MCV RBC AUTO: 99.3 FL (ref 80–99)
MICROALBUMIN UR-MCNC: 1.88 MG/DL
MICROALBUMIN/CREAT 24H UR-RTO: 13.4 UG/MG (ref ?–30)
MONOCYTES # BLD AUTO: 0.86 X10(3) UL (ref 0.1–1)
MONOCYTES # BLD AUTO: 0.92 X10(3) UL (ref 0.1–0.6)
MONOCYTES NFR BLD AUTO: 7.4 %
MONOCYTES NFR BLD AUTO: 8.1 %
NEUTROPHIL ABS PRELIM: 5.92 X10 (3) UL (ref 1.3–6.7)
NEUTROPHIL ABS PRELIM: 6.91 X10 (3) UL (ref 1.3–6.7)
NEUTROPHILS # BLD AUTO: 5.92 X10(3) UL (ref 1.3–6.7)
NEUTROPHILS # BLD AUTO: 6.91 X10(3) UL (ref 1.3–6.7)
NEUTROPHILS NFR BLD AUTO: 55.3 %
NEUTROPHILS NFR BLD AUTO: 55.5 %
NITRITE UR QL STRIP.AUTO: NEGATIVE
NONHDLC SERPL-MCNC: 86 MG/DL (ref ?–130)
PH UR STRIP.AUTO: 5 [PH] (ref 4.5–8)
PLATELET # BLD AUTO: 238 10(3)UL (ref 150–450)
PLATELET # BLD AUTO: 277 10(3)UL (ref 150–450)
POTASSIUM SERPL-SCNC: 4.1 MMOL/L (ref 3.6–5.1)
PROT UR STRIP.AUTO-MCNC: NEGATIVE MG/DL
RBC # BLD AUTO: 4.04 X10(6)UL (ref 3.8–5.8)
RBC # BLD AUTO: 4.39 X10(6)UL (ref 3.8–5.8)
RBC UR QL AUTO: NEGATIVE
RED CELL DISTRIBUTION WIDTH-SD: 48.2 FL (ref 35.1–46.3)
RED CELL DISTRIBUTION WIDTH-SD: 49.2 FL (ref 35.1–46.3)
SED RATE-ML: 28 MM/HR (ref 0–12)
SODIUM SERPL-SCNC: 140 MMOL/L (ref 136–144)
SP GR UR STRIP.AUTO: 1.02 (ref 1–1.03)
TRIGL SERPL-MCNC: 133 MG/DL (ref ?–150)
URIC ACID: 5.1 MG/DL (ref 2.4–8.7)
UROBILINOGEN UR STRIP.AUTO-MCNC: <2 MG/DL
VLDLC SERPL CALC-MCNC: 27 MG/DL (ref 5–40)
WBC # BLD AUTO: 10.7 X10(3) UL (ref 4–13)
WBC # BLD AUTO: 12.5 X10(3) UL (ref 4–13)

## 2018-01-01 PROCEDURE — 80053 COMPREHEN METABOLIC PANEL: CPT

## 2018-01-01 PROCEDURE — 99232 SBSQ HOSP IP/OBS MODERATE 35: CPT | Performed by: HOSPITALIST

## 2018-01-01 PROCEDURE — G0444 DEPRESSION SCREEN ANNUAL: HCPCS | Performed by: FAMILY MEDICINE

## 2018-01-01 PROCEDURE — 82570 ASSAY OF URINE CREATININE: CPT

## 2018-01-01 PROCEDURE — 83036 HEMOGLOBIN GLYCOSYLATED A1C: CPT

## 2018-01-01 PROCEDURE — 99215 OFFICE O/P EST HI 40 MIN: CPT | Performed by: FAMILY MEDICINE

## 2018-01-01 PROCEDURE — 82306 VITAMIN D 25 HYDROXY: CPT

## 2018-01-01 PROCEDURE — 84550 ASSAY OF BLOOD/URIC ACID: CPT

## 2018-01-01 PROCEDURE — 99497 ADVNCD CARE PLAN 30 MIN: CPT | Performed by: FAMILY MEDICINE

## 2018-01-01 PROCEDURE — 70450 CT HEAD/BRAIN W/O DYE: CPT | Performed by: PHYSICIAN ASSISTANT

## 2018-01-01 PROCEDURE — 86580 TB INTRADERMAL TEST: CPT | Performed by: FAMILY MEDICINE

## 2018-01-01 PROCEDURE — 82043 UR ALBUMIN QUANTITATIVE: CPT

## 2018-01-01 PROCEDURE — 82607 VITAMIN B-12: CPT

## 2018-01-01 PROCEDURE — 83540 ASSAY OF IRON: CPT

## 2018-01-01 PROCEDURE — 74177 CT ABD & PELVIS W/CONTRAST: CPT | Performed by: INTERNAL MEDICINE

## 2018-01-01 PROCEDURE — 80061 LIPID PANEL: CPT

## 2018-01-01 PROCEDURE — 85025 COMPLETE CBC W/AUTO DIFF WBC: CPT

## 2018-01-01 PROCEDURE — 99232 SBSQ HOSP IP/OBS MODERATE 35: CPT | Performed by: INTERNAL MEDICINE

## 2018-01-01 PROCEDURE — 36415 COLL VENOUS BLD VENIPUNCTURE: CPT

## 2018-01-01 PROCEDURE — G0439 PPPS, SUBSEQ VISIT: HCPCS | Performed by: FAMILY MEDICINE

## 2018-01-01 PROCEDURE — 78306 BONE IMAGING WHOLE BODY: CPT | Performed by: INTERNAL MEDICINE

## 2018-01-01 PROCEDURE — 99239 HOSP IP/OBS DSCHRG MGMT >30: CPT | Performed by: HOSPITALIST

## 2018-01-01 PROCEDURE — 82728 ASSAY OF FERRITIN: CPT

## 2018-01-01 PROCEDURE — 99233 SBSQ HOSP IP/OBS HIGH 50: CPT | Performed by: INTERNAL MEDICINE

## 2018-01-01 PROCEDURE — 71046 X-RAY EXAM CHEST 2 VIEWS: CPT | Performed by: FAMILY MEDICINE

## 2018-01-01 PROCEDURE — 71045 X-RAY EXAM CHEST 1 VIEW: CPT | Performed by: PHYSICIAN ASSISTANT

## 2018-01-01 PROCEDURE — 99223 1ST HOSP IP/OBS HIGH 75: CPT | Performed by: INTERNAL MEDICINE

## 2018-01-01 PROCEDURE — 85652 RBC SED RATE AUTOMATED: CPT

## 2018-01-01 PROCEDURE — 90653 IIV ADJUVANT VACCINE IM: CPT | Performed by: FAMILY MEDICINE

## 2018-01-01 PROCEDURE — 71275 CT ANGIOGRAPHY CHEST: CPT | Performed by: EMERGENCY MEDICINE

## 2018-01-01 PROCEDURE — 99214 OFFICE O/P EST MOD 30 MIN: CPT | Performed by: FAMILY MEDICINE

## 2018-01-01 PROCEDURE — 99223 1ST HOSP IP/OBS HIGH 75: CPT | Performed by: HOSPITALIST

## 2018-01-01 PROCEDURE — 78399 UNLISTED MUSCSKEL PX DX NUC: CPT | Performed by: INTERNAL MEDICINE

## 2018-01-01 PROCEDURE — G0008 ADMIN INFLUENZA VIRUS VAC: HCPCS | Performed by: FAMILY MEDICINE

## 2018-01-01 PROCEDURE — 78320 NM BONE SPECT WITH CT (CPT=78306/78320/78399): CPT | Performed by: INTERNAL MEDICINE

## 2018-01-01 PROCEDURE — 86140 C-REACTIVE PROTEIN: CPT

## 2018-01-01 PROCEDURE — 81003 URINALYSIS AUTO W/O SCOPE: CPT

## 2018-01-01 PROCEDURE — 83550 IRON BINDING TEST: CPT

## 2018-01-01 RX ORDER — CARBIDOPA AND LEVODOPA 25; 100 MG/1; MG/1
TABLET, EXTENDED RELEASE ORAL
COMMUNITY
Start: 2018-01-01 | End: 2018-01-01

## 2018-01-01 RX ORDER — MORPHINE SULFATE 4 MG/ML
1 INJECTION, SOLUTION INTRAMUSCULAR; INTRAVENOUS EVERY 2 HOUR PRN
Status: DISCONTINUED | OUTPATIENT
Start: 2018-01-01 | End: 2018-01-01

## 2018-01-01 RX ORDER — OMEPRAZOLE 40 MG/1
CAPSULE, DELAYED RELEASE ORAL
Qty: 90 CAPSULE | Refills: 1 | Status: SHIPPED | OUTPATIENT
Start: 2018-01-01

## 2018-01-01 RX ORDER — ONDANSETRON 2 MG/ML
4 INJECTION INTRAMUSCULAR; INTRAVENOUS EVERY 6 HOURS PRN
Status: DISCONTINUED | OUTPATIENT
Start: 2018-01-01 | End: 2018-01-01

## 2018-01-01 RX ORDER — SIMVASTATIN 40 MG
TABLET ORAL
Qty: 90 TABLET | Refills: 0 | Status: SHIPPED | OUTPATIENT
Start: 2018-01-01 | End: 2018-01-01

## 2018-01-01 RX ORDER — PREDNISONE 10 MG/1
TABLET ORAL
Qty: 10 TABLET | Refills: 0 | Status: SHIPPED | OUTPATIENT
Start: 2018-01-01 | End: 2018-01-01 | Stop reason: ALTCHOICE

## 2018-01-01 RX ORDER — DOCUSATE SODIUM 100 MG/1
100 CAPSULE, LIQUID FILLED ORAL 2 TIMES DAILY
Status: DISCONTINUED | OUTPATIENT
Start: 2018-01-01 | End: 2018-01-01

## 2018-01-01 RX ORDER — DEXAMETHASONE 4 MG/1
4 TABLET ORAL EVERY 12 HOURS SCHEDULED
Status: DISCONTINUED | OUTPATIENT
Start: 2018-01-01 | End: 2018-01-01

## 2018-01-01 RX ORDER — HYDROCODONE BITARTRATE AND ACETAMINOPHEN 5; 325 MG/1; MG/1
1 TABLET ORAL EVERY 4 HOURS PRN
Status: DISCONTINUED | OUTPATIENT
Start: 2018-01-01 | End: 2018-01-01

## 2018-01-01 RX ORDER — ALLOPURINOL 100 MG/1
100 TABLET ORAL DAILY
Status: DISCONTINUED | OUTPATIENT
Start: 2018-01-01 | End: 2018-01-01

## 2018-01-01 RX ORDER — DEXTROSE MONOHYDRATE 25 G/50ML
50 INJECTION, SOLUTION INTRAVENOUS
Status: DISCONTINUED | OUTPATIENT
Start: 2018-01-01 | End: 2018-01-01

## 2018-01-01 RX ORDER — PREDNISONE 10 MG/1
TABLET ORAL
Qty: 27 TABLET | Refills: 0 | Status: SHIPPED | OUTPATIENT
Start: 2018-01-01 | End: 2018-01-01 | Stop reason: ALTCHOICE

## 2018-01-01 RX ORDER — MECLIZINE HCL 12.5 MG/1
12.5 TABLET ORAL 3 TIMES DAILY PRN
Status: DISCONTINUED | OUTPATIENT
Start: 2018-01-01 | End: 2018-01-01

## 2018-01-01 RX ORDER — OMEPRAZOLE 40 MG/1
CAPSULE, DELAYED RELEASE ORAL
Qty: 90 CAPSULE | Refills: 0 | Status: SHIPPED | OUTPATIENT
Start: 2018-01-01 | End: 2018-01-01

## 2018-01-01 RX ORDER — SODIUM CHLORIDE 9 MG/ML
1000 INJECTION, SOLUTION INTRAVENOUS ONCE
Status: COMPLETED | OUTPATIENT
Start: 2018-01-01 | End: 2018-01-01

## 2018-01-01 RX ORDER — ALLOPURINOL 100 MG/1
TABLET ORAL
Qty: 90 TABLET | Refills: 1 | Status: SHIPPED | OUTPATIENT
Start: 2018-01-01 | End: 2018-01-01

## 2018-01-01 RX ORDER — ACETAMINOPHEN 325 MG/1
650 TABLET ORAL EVERY 4 HOURS PRN
Status: DISCONTINUED | OUTPATIENT
Start: 2018-01-01 | End: 2018-01-01

## 2018-01-01 RX ORDER — SODIUM CHLORIDE 9 MG/ML
INJECTION, SOLUTION INTRAVENOUS CONTINUOUS
Status: ACTIVE | OUTPATIENT
Start: 2018-01-01 | End: 2018-01-01

## 2018-01-01 RX ORDER — LISINOPRIL 5 MG/1
TABLET ORAL
Qty: 90 TABLET | Refills: 0 | Status: SHIPPED | OUTPATIENT
Start: 2018-01-01 | End: 2018-01-01

## 2018-01-01 RX ORDER — FLUTICASONE PROPIONATE 50 MCG
1 SPRAY, SUSPENSION (ML) NASAL DAILY
Status: DISCONTINUED | OUTPATIENT
Start: 2018-01-01 | End: 2018-01-01

## 2018-01-01 RX ORDER — HYDROCODONE BITARTRATE AND ACETAMINOPHEN 5; 325 MG/1; MG/1
2 TABLET ORAL EVERY 4 HOURS PRN
Status: DISCONTINUED | OUTPATIENT
Start: 2018-01-01 | End: 2018-01-01

## 2018-01-01 RX ORDER — MECLIZINE HCL 12.5 MG/1
12.5 TABLET ORAL 3 TIMES DAILY PRN
Qty: 30 TABLET | Refills: 0 | Status: SHIPPED | OUTPATIENT
Start: 2018-01-01 | End: 2018-01-01

## 2018-01-01 RX ORDER — KETOROLAC TROMETHAMINE 30 MG/ML
15 INJECTION, SOLUTION INTRAMUSCULAR; INTRAVENOUS ONCE
Status: COMPLETED | OUTPATIENT
Start: 2018-01-01 | End: 2018-01-01

## 2018-01-01 RX ORDER — IBUPROFEN 400 MG/1
400 TABLET ORAL 3 TIMES DAILY
Status: COMPLETED | OUTPATIENT
Start: 2018-01-01 | End: 2018-01-01

## 2018-01-01 RX ORDER — POLYETHYLENE GLYCOL 3350 17 G/17G
17 POWDER, FOR SOLUTION ORAL DAILY PRN
Status: DISCONTINUED | OUTPATIENT
Start: 2018-01-01 | End: 2018-01-01

## 2018-01-01 RX ORDER — LISINOPRIL 5 MG/1
5 TABLET ORAL
Status: DISCONTINUED | OUTPATIENT
Start: 2018-01-01 | End: 2018-01-01

## 2018-01-01 RX ORDER — ACETAMINOPHEN 325 MG/1
650 TABLET ORAL EVERY 6 HOURS PRN
Status: DISCONTINUED | OUTPATIENT
Start: 2018-01-01 | End: 2018-01-01

## 2018-01-01 RX ORDER — MELATONIN
1000 DAILY
COMMUNITY

## 2018-01-01 RX ORDER — ATORVASTATIN CALCIUM 20 MG/1
20 TABLET, FILM COATED ORAL NIGHTLY
Status: DISCONTINUED | OUTPATIENT
Start: 2018-01-01 | End: 2018-01-01

## 2018-01-01 RX ORDER — SIMVASTATIN 40 MG
TABLET ORAL
Qty: 90 TABLET | Refills: 0 | Status: SHIPPED | OUTPATIENT
Start: 2018-01-01

## 2018-01-01 RX ORDER — PREDNISONE 20 MG/1
20 TABLET ORAL ONCE
Status: COMPLETED | OUTPATIENT
Start: 2018-01-01 | End: 2018-01-01

## 2018-01-01 RX ORDER — BUPROPION HYDROCHLORIDE 150 MG/1
150 TABLET, EXTENDED RELEASE ORAL DAILY
Status: DISCONTINUED | OUTPATIENT
Start: 2018-01-01 | End: 2018-01-01

## 2018-01-01 RX ORDER — OCTISALATE, AVOBENZONE, HOMOSALATE, AND OCTOCRYLENE 29.4; 29.4; 49; 25.48 MG/ML; MG/ML; MG/ML; MG/ML
4 LOTION TOPICAL DAILY
Status: DISCONTINUED | OUTPATIENT
Start: 2018-01-01 | End: 2018-01-01

## 2018-01-01 RX ORDER — MECLIZINE HCL 12.5 MG/1
12.5 TABLET ORAL 3 TIMES DAILY PRN
Qty: 90 TABLET | Refills: 1 | Status: SHIPPED | OUTPATIENT
Start: 2018-01-01

## 2018-01-01 RX ORDER — METOCLOPRAMIDE HYDROCHLORIDE 5 MG/ML
10 INJECTION INTRAMUSCULAR; INTRAVENOUS EVERY 8 HOURS PRN
Status: DISCONTINUED | OUTPATIENT
Start: 2018-01-01 | End: 2018-01-01

## 2018-01-01 RX ORDER — ASPIRIN 81 MG/1
81 TABLET, CHEWABLE ORAL DAILY
Status: DISCONTINUED | OUTPATIENT
Start: 2018-01-01 | End: 2018-01-01

## 2018-01-01 RX ORDER — HYDROCODONE BITARTRATE AND ACETAMINOPHEN 5; 325 MG/1; MG/1
1 TABLET ORAL EVERY 4 HOURS PRN
Qty: 15 TABLET | Refills: 0 | Status: SHIPPED | OUTPATIENT
Start: 2018-01-01 | End: 2018-01-01

## 2018-01-01 RX ORDER — HYDROCODONE BITARTRATE AND ACETAMINOPHEN 5; 325 MG/1; MG/1
1 TABLET ORAL EVERY 4 HOURS PRN
Qty: 15 TABLET | Refills: 0 | Status: SHIPPED | OUTPATIENT
Start: 2018-01-01

## 2018-01-01 RX ORDER — LISINOPRIL 5 MG/1
5 TABLET ORAL
Qty: 90 TABLET | Refills: 0 | Status: SHIPPED | OUTPATIENT
Start: 2018-01-01 | End: 2018-01-01

## 2018-01-01 RX ORDER — SODIUM CHLORIDE 9 MG/ML
INJECTION, SOLUTION INTRAVENOUS CONTINUOUS
Status: DISCONTINUED | OUTPATIENT
Start: 2018-01-01 | End: 2018-01-01

## 2018-01-01 RX ORDER — PANTOPRAZOLE SODIUM 40 MG/1
40 TABLET, DELAYED RELEASE ORAL
Status: DISCONTINUED | OUTPATIENT
Start: 2018-01-01 | End: 2018-01-01

## 2018-01-01 RX ORDER — SODIUM PHOSPHATE, DIBASIC AND SODIUM PHOSPHATE, MONOBASIC 7; 19 G/133ML; G/133ML
1 ENEMA RECTAL ONCE AS NEEDED
Status: DISCONTINUED | OUTPATIENT
Start: 2018-01-01 | End: 2018-01-01

## 2018-01-01 RX ORDER — ALLOPURINOL 100 MG/1
TABLET ORAL
Qty: 90 TABLET | Refills: 0 | Status: SHIPPED | OUTPATIENT
Start: 2018-01-01

## 2018-01-01 RX ORDER — MIRTAZAPINE 15 MG/1
15 TABLET, FILM COATED ORAL NIGHTLY
Status: DISCONTINUED | OUTPATIENT
Start: 2018-01-01 | End: 2018-01-01

## 2018-01-01 RX ORDER — BISACODYL 10 MG
10 SUPPOSITORY, RECTAL RECTAL
Status: DISCONTINUED | OUTPATIENT
Start: 2018-01-01 | End: 2018-01-01

## 2018-01-01 RX ORDER — ENOXAPARIN SODIUM 100 MG/ML
40 INJECTION SUBCUTANEOUS DAILY
Status: DISCONTINUED | OUTPATIENT
Start: 2018-01-01 | End: 2018-01-01

## 2018-01-01 RX ORDER — BUPROPION HYDROCHLORIDE 150 MG/1
150 TABLET ORAL DAILY
COMMUNITY
Start: 2018-01-01 | End: 2018-01-01

## 2018-01-01 RX ORDER — LISINOPRIL 5 MG/1
TABLET ORAL
Qty: 90 TABLET | Refills: 0 | Status: SHIPPED | OUTPATIENT
Start: 2018-01-01

## 2018-01-01 RX ORDER — MIRTAZAPINE 15 MG/1
15 TABLET, FILM COATED ORAL NIGHTLY
COMMUNITY

## 2018-01-01 RX ORDER — MORPHINE SULFATE 4 MG/ML
2 INJECTION, SOLUTION INTRAMUSCULAR; INTRAVENOUS EVERY 2 HOUR PRN
Status: DISCONTINUED | OUTPATIENT
Start: 2018-01-01 | End: 2018-01-01

## 2018-01-01 RX ORDER — MORPHINE SULFATE 4 MG/ML
4 INJECTION, SOLUTION INTRAMUSCULAR; INTRAVENOUS EVERY 2 HOUR PRN
Status: DISCONTINUED | OUTPATIENT
Start: 2018-01-01 | End: 2018-01-01

## 2018-01-01 RX ORDER — DEXAMETHASONE 4 MG/1
4 TABLET ORAL EVERY 12 HOURS SCHEDULED
Qty: 60 TABLET | Refills: 0 | Status: SHIPPED | OUTPATIENT
Start: 2018-01-01

## 2018-01-17 NOTE — PATIENT INSTRUCTIONS
Continue current meds. Watch diet for fats and carbs. Stay active. Schedule Medicare wellness visit for May 2018. Fasting blood work one week prior visit.

## 2018-01-17 NOTE — PROGRESS NOTES
Mauricio Faria is a 80year old male. cc diabetes, hypertension, hyperlipidemia, coronary artery disease, vitamin B12 deficiency, vitamin D deficiency, gout, neuropathy, Parkinson's disease, stress obstructive sleep apnea,   HPI:   Patient is coming for fo mouth daily. Disp:  Rfl:    allopurinol 100 MG Oral Tab Take 1 tablet (100 mg total) by mouth once daily.  Disp: 90 tablet Rfl: 1   simvastatin 40 MG Oral Tab TAKE 1 TABLET BY MOUTH EVERY EVENING Disp: 90 tablet Rfl: 0   MetFORMIN HCl 500 MG Oral Tab TAKE 1 PRESSURE    • HIGH CHOLESTEROL    • Involuntary stool    • LINDSAY on CPAP    • Other and unspecified hyperlipidemia    • Peripheral neuropathy    • Stress    • Type II or unspecified type diabetes mellitus without mention of complication, not stated as uncont is normal as well.   Psychiatric - alert  and oriented x3, normal mood       Results for orders placed or performed in visit on [birth year]  -COMP METABOLIC PANEL (14)   Result Value Ref Range   Glucose 100 (H) 70 - 99 mg/dL   BUN 18 8 - 20 mg/dL   Creatinine 8.7 mg/dL   -CBC W/ DIFFERENTIAL   Result Value Ref Range   WBC 12.5 4.0 - 13.0 x10(3) uL   RBC 4.39 3.80 - 5.80 x10(6)uL   HGB 13.7 13.0 - 17.0 g/dL   HCT 42.6 37.0 - 53.0 %   .0 150.0 - 450.0 10(3)uL   MCV 97.0 80.0 - 99.0 fL   MCH 31.2 27.0 - 33. needed. Follow-up with ophthalmologist.    Helga Sarah with Parkinson's doctor as needed. Schedule Medicare wellness visit for May 2018. Fasting blood work one week prior visit. Form filled out for patient  handicapped parking placard.   [employer]

## 2018-02-05 NOTE — TELEPHONE ENCOUNTER
Pt needs refill of LISINOPRIL 5 MG Oral Tab sent to [facility] on file. This will be the first time [facility] will fill this med. Please advise. Thank you.

## 2018-05-16 NOTE — PROGRESS NOTES
HPI:   Eliceo Alvarez is a 80year old male who presents for a Medicare Subsequent Annual Wellness visit (Pt already had Initial Annual Wellness) also follow-up on diabetes hypertension hyperlipidemia neuropathy, gout, Parkinson's disease, vitamin D def disease)     LINDSAY on CPAP     Parkinson's disease ([facility] [address]     DDD (degenerative disc disease), lumbar     Asymmetrical right sensorineural hearing loss     Coronary artery disease involving native heart without angina pectoris     Idiopathic gout     Diabetes Take 150 mg by mouth daily. Triamcinolone Acetonide (NASACORT) 55 MCG/ACT Nasal Aerosol 2 sprays by Nasal route daily. docusate sodium (COLACE) 100 MG Oral Cap Take 200 mg by mouth daily.      Cholecalciferol (VITAMIN D3) 1000 UNITS Oral Cap Take 2 tabl hx of allergy or asthma    EXAM:   /60 (BP Location: Left arm, Patient Position: Sitting, Cuff Size: adult)   Pulse 74   Temp 97.8 °F (36.6 °C) (Oral)   Resp 16   Ht 70\"   Wt 168 lb   BMI 24.11 kg/m²   Estimated body mass index is 24.11 kg/m² as ilsa 10/01/2014, 10/27/2015   • Influenza Vaccine, High Dose, Preserv Free 10/26/2016   • Pneumococcal (Prevnar 13) 11/10/1992, 09/15/1998, 01/07/2015   • TD 05/18/1993, 03/03/2003   • TDAP 01/07/2015   • Zoster (Shingles) 10/16/2009       Results for orders pl RATIO, RANDOM URINE   Result Value Ref Range   Microalbumin, Urine 1.27 mg/dL   Creatinine Ur Random 73.20 mg/dL   Malb/Cre Calc 17.3 <=30.0 ug/mg   -URIC ACID, SERUM   Result Value Ref Range   Uric Acid 4.9 2.4 - 8.7 mg/dL   -FERRITIN   Result Value Ref R without angina pectoris    Idiopathic gout, unspecified chronicity, unspecified site  -     CBC WITH DIFFERENTIAL WITH PLATELET;  Future  -     URIC ACID, SERUM; Future    Gastroesophageal reflux disease without esophagitis    Parkinson's disease ([facility]    D as Influenza, Hepatitis B, Tetanus, or Pneumococcal?: No     Functional Ability     Bathing or Showering: Able without help    Toileting: Able without help    Dressing: Able without help    Eating: Able without help    Driving: Cannot do without help    Pr Correct    Recall \"Tree\": Correct       This section provided for quick review of chart, separate sheet to patient  PREVENTATIVE SERVICES  INDICATIONS AND SCHEDULE Internal Lab or Procedure External Lab or Procedure   Diabetes Screening      HbgA1C   Leroy Abraham visit on 01/07/15  -TETANUS, DIPHTHERIA TOXOIDS AND ACELLULAR PERTUSIS VACCINE (TDAP), >7 YEARS, IM USE            SPECIFIC DISEASE MONITORING Internal Lab or Procedure External Lab or Procedure   Annual Monitoring of Persistent     Medications (ACE/ARB, d

## 2018-05-16 NOTE — PATIENT INSTRUCTIONS
Continue current meds. Watch diet for fats and carbs. Stay active. Prince Hadley's SCREENING SCHEDULE   Tests on this list are recommended by your physician but may not be covered, or covered at this frequency, by your insurer.  Please check with yo screening (once between ages 73-68)  No results found for this or any previous visit.  Limited to patients who meet one of the following criteria:   • Men who are 73-68 years old and have smoked more than 100 cigarettes in their lifetime   • Anyone with a f previous visit.  Medium/high risk factors:   End-stage renal disease   Hemophiliacs who received Factor VIII or IX concentrates   Clients of institutions for the mentally retarded   Persons who live in the same house as a HepB virus carrier   Homosexual men

## 2018-06-11 NOTE — TELEPHONE ENCOUNTER
Pt is using new pharmacy and needs two of his scripts sent there. They have not been filled there yet so he needs new scripts sent. OMEPRAZOLE 40 MG  Meclizine HCl 12.5 MG    Please send these new scripts to [facility] in [place].

## 2018-06-11 NOTE — TELEPHONE ENCOUNTER
Refill request received from [address] [place] [address] for Omeprazole.     Last refill 11/2017  Last OV 05/16/2018  Omeprazole 40 MG oral # 90 no refills  Take 1 capsule by mouth every day

## 2018-06-11 NOTE — TELEPHONE ENCOUNTER
Not a protocol medication last OV 5/16/18, last refill 9/27/17 #30.   Please review and refill if appropriate, thank you

## 2018-06-18 NOTE — TELEPHONE ENCOUNTER
Please refill meds today. Pt is in assisted living and these meds have to be delivered to the pt.  Delivery  goes out in the AM.

## 2018-07-10 NOTE — TELEPHONE ENCOUNTER
1. What are your symptoms? Pain in upper back/left shoulder  Yesterday was 8 out of 10  Today was 2 out 10. Short of breath    2. How long have you been having these symptoms? Started yesterday    3.  Have you done anything already to treat your sy

## 2018-07-11 NOTE — TELEPHONE ENCOUNTER
I spoke to patient and he has an appointment today-looks like labs ordered may be too soon to draw, I will consult with .  I consulted with  and she said to have patient seen first, I did leave voice mail to see her 1st and then we will decide if o

## 2018-07-11 NOTE — PATIENT INSTRUCTIONS
Use Aleve 1 tablet twice a day as needed for pain for now. Okay to use warm compresses. You can use Aspercreme or Biofreeze topically if needed for pain. Do blood work and chest x-ray tomorrow.

## 2018-07-11 NOTE — TELEPHONE ENCOUNTER
Pt called and advised that he has an appt today with Dr Adelaida Ernst at 4. He advised that he has blood work to do, however it's different from the tests that I see in the system.  Please call him back and let him know if he can do the tests that are listed

## 2018-07-11 NOTE — PROGRESS NOTES
Arvin Ramirez is a 80year old male. cc shoulder pain bilateral, chest wall pain history of Parkinson's disease.  ,   HPI:   Patient is coming to the office for evaluation of the bilateral shoulder pain. It started initially with left-sided 3 weeks ago. mirtazapine 15 MG Oral Tab Take 15 mg by mouth nightly. Disp:  Rfl:    LISINOPRIL 5 MG Oral Tab TAKE ONE TABLET BY MOUTH ONE TIME DAILY  Disp: 90 tablet Rfl: 0   Vitamin B-12 1000 MCG Oral Tab Take 1,000 mcg by mouth daily.  Disp:  Rfl:    carbidopa-levod Years: 20.00        Types: Cigarettes     Quit date: 1/1/1970  Smokeless tobacco: Never Used                      Alcohol use:  Yes              Comment: 1 drink 5 x per week       REVIEW OF SYSTEMS:   GENERAL HEALTH: feels well otherwise  SKIN: denies any Protein [E]      CBC W DIFFERENTIAL W PLATELET    Meds & Refills for this Visit:  Signed Prescriptions Disp Refills    predniSONE 10 MG Oral Tab 27 tablet 0      Sig: Take  4 tablets for 3 days, then 3 tablets for 3 days, then 2 tablets for 2 days, the 1 t

## 2018-07-20 NOTE — TELEPHONE ENCOUNTER
Tc to pt on this. Was seen 7/11 for shoulder, chest wall pain. Xray wnl. Reports the prednisone taper helped some ~50%. Denies any new or worsening sx's. Reports before prednisone pain was 8/10 in am when getting out of bed. Now pain is 2/3-10.     He

## 2018-07-20 NOTE — TELEPHONE ENCOUNTER
Okay to call prednisone 10 mg tablets take 3 tablets for 3 days then 2 tablets for 3 days then 1 tablet for 4 days and stop #10 tablets. It could elevate his sugars keep good hydration. Low-carb diet.

## 2018-07-20 NOTE — TELEPHONE ENCOUNTER
Pt called advising that he still experiencing pain in his chest.  He was prescribed prednisone from Dr. Doug Diaz. He's asking if he should be prescribed some more. He has no more pills left.   Please advise

## 2018-07-20 NOTE — TELEPHONE ENCOUNTER
[facility] called to clarify the quantity of the prednisone to be dispensed. Pt was prescribed prednisone 10 mg she is to take 3 tabs for 3 days, 2 tabs for 3 days, then 1 tab for 4 days. This =19 tabs. Original Rx was sent for # 10. Correction made.

## 2018-08-06 NOTE — TELEPHONE ENCOUNTER
1. What are your symptoms? THINK BRONCITIS, BREATHING LITTLE HEAVY      2. How long have you been having these symptoms? ONE WEEK GETTING WORSE    3. Have you done anything already to treat your symptoms?    NO      ADDITIONAL INFO:

## 2018-08-06 NOTE — TELEPHONE ENCOUNTER
I will like patient to try Robitussin DM 1 tablespoon 2-3 times per day as needed cough, monitor symptoms if there is no improvement by Wednesday Thursday this week call office for evaluation.

## 2018-08-30 NOTE — TELEPHONE ENCOUNTER
Left a message for the to call the office back re   Some questions on his form from Osmar Bateman. The form is on my desk if his daughter Petra Matt or Torri Barrios calls back.

## 2018-08-30 NOTE — TELEPHONE ENCOUNTER
Spoke with the pt daughter Darren Darby and she was able to help me complete   The pt assisted living paperwork .     Forms given to Dr. Adelaida Ernst

## 2018-09-18 NOTE — TELEPHONE ENCOUNTER
Daughter called to say pt is moving on Monday, October 8 to [address] in [place] ([phone]) (pt is currently in independent living at [facility]). Daughter calling to get an order for an adjustable hospital bed, twin XL.   MADDIE kuhn

## 2018-09-19 NOTE — TELEPHONE ENCOUNTER
Fiona Hernandez (direct line [phone]) called to say their therapy department assesses the pt once they take residence. Fiona Hernandez advised daughter to go through pt's doctor to see if he can order b/c daughter would like bed at facility when pt arrives on October 8.

## 2018-09-21 NOTE — TELEPHONE ENCOUNTER
Per [employer], the pt must meet one of the following requirements for Medicare to cover a hospital bed:    1) The beneficiary has a medical condition which requires positioning of the body in ways not feasible with an ordinary bed.   Elevation of the head/upper

## 2018-09-25 NOTE — TELEPHONE ENCOUNTER
Left a detailed message for the pt daughter Wally Boswell to give our office a call   And place her dad on Dr. Van Billingsley schedule so she can complete his referral  For his hospital bed.

## 2018-09-26 NOTE — TELEPHONE ENCOUNTER
Spoke with Jennifer Camara the pt (OMEGA) daughter and informed her that there is also  A few more detailed request for the [facility]. Jennifer Camara stated that they have an appt with Dr. Wynell Romberg on Monday.

## 2018-09-27 NOTE — TELEPHONE ENCOUNTER
DME order was pended but looks like pt has appt Monday to discuss. I have removed pended order and closed.

## 2018-10-01 NOTE — PROGRESS NOTES
Gage Grubbs is a 80year old male. cc diabetes, hypertension, hyperlipidemia, coronary artery disease, vitamin B12 deficiency, vitamin D deficiency, gout, neuropathy, Parkinson's disease, stress obstructive sleep apnea, balance problem, chest wall pain refills of the medications. Pt is due for the blood test results. Coronary artery disease asymptomatic no chest pain no shortness of breath no palpitation.   Does not want to see cardiologist.    Vitamin B12 deficiency patient is taking supplementation THE EVENING Disp: 90 tablet Rfl: 0   LISINOPRIL 5 MG Oral Tab TAKE ONE TABLET BY MOUTH ONE TIME DAILY  Disp: 90 tablet Rfl: 0   Omeprazole 40 MG Oral Capsule Delayed Release TAKE 1 CAPSULE(40 MG) BY MOUTH EVERY DAY Disp: 90 capsule Rfl: 1   Meclizine HCl 1 without mention of complication, not stated as uncontrolled    • Unspecified essential hypertension       Social History:  Social History    Tobacco Use      Smoking status: Former Smoker        Packs/day: 1.00        Years: 20.00        Pack years: 21 Value Ref Range    C-Reactive Protein 1.88 (H) <1.00 mg/dL   CBC W/ DIFFERENTIAL   Result Value Ref Range    WBC 10.7 4.0 - 13.0 x10(3) uL    RBC 4.04 3.80 - 5.80 x10(6)uL    HGB 12.8 (L) 13.0 - 17.0 g/dL    HCT 40.1 37.0 - 53.0 %    .0 150.0 - 450. on medication continue     Monitor cholesterol levels    Coronary artery disease stable asymptomatic monitor    Diabetic polyneuropathy continue present management monitor      Parkinson's disease patient is managed by neurologist medication managed by the issues and agrees to the plan. The patient is asked to return in January 2019. Time spent was 53 min, more than 50% was spent on counseling regarding medical conditions and treatment.   More than 16 minutes was discussed with patient regarding his adva

## 2018-10-01 NOTE — PATIENT INSTRUCTIONS
Stop lisinopril. Monitor blood pressure with the nurse if possible. Continue other medications. Healthy diet. Do fasting blood work one week prior next visit.

## 2018-10-04 NOTE — TELEPHONE ENCOUNTER
Referral has been approved. Gavi dye/Neeraj at [facility] ([phone]). Faxed over demo, referral and chart notes to [facility] at [phone] (receipt confirmed).  Once [facility] receives and confirms pt is eligible, they will fax over a prescription that Dr. Keith Slight

## 2018-10-08 NOTE — TELEPHONE ENCOUNTER
Jesi Anthony called from [facility] assisted living . [phone]. Pt is a new resident. We reviewed his mediation list and verified pt has no known Drug allergies. Jesi Anthony will fax over the med list for review.

## 2018-12-03 PROBLEM — R79.89 AZOTEMIA: Status: ACTIVE | Noted: 2018-01-01

## 2018-12-03 PROBLEM — C79.51 METASTATIC CANCER TO SPINE (HCC): Status: ACTIVE | Noted: 2018-01-01

## 2018-12-03 PROBLEM — R73.9 HYPERGLYCEMIA: Status: ACTIVE | Noted: 2018-01-01

## 2018-12-03 PROBLEM — R53.1 WEAKNESS GENERALIZED: Status: ACTIVE | Noted: 2018-01-01

## 2018-12-03 NOTE — ED INITIAL ASSESSMENT (HPI)
Pt is a resident of [facility] who is brought by ambulance for complaints of generalized weakness. Pt complaining of progressive weakness over the past 2 days. Normally able to ambulate independently with walker 3 days ago.  2 falls sinc

## 2018-12-03 NOTE — ED PROVIDER NOTES
Patient Seen in: [facility] Emergency Department    History   Patient presents with:  Dehydration (metabolic/constitutional)    Stated Complaint: weakness    HPI    Patient is a 80-year-old male.   Patient is a retired ophthalmologist.  Patient sent OTHER SURGICAL HISTORY  2010    TURP   • TONSILLECTOMY      and adenoidectomy           Social History    Tobacco Use      Smoking status: Former Smoker        Packs/day: 1.00        Years: 20.00        Pack years: 20        Types: Cigarettes        Quit d muscle tone and strength equal bilateral.   Neuro: Cranial nerves intact, normal cerebellar exam.     ED Course     Labs Reviewed   COMP METABOLIC PANEL (14) - Abnormal; Notable for the following components:       Result Value    Glucose 108 (*)     BUN/CR abnormalities.   When compared to EKG performed November 2013, no acute changes noted           Ct Brain Or Head (92699)    Result Date: 12/3/2018  PROCEDURE:  CT BRAIN OR HEAD (98937)  COMPARISON:  [facility] , MRI BRAIN/IAC (ALL W+WO CNTRST) (CPT=70553), 5/15 transcribed by Technologist)  Patient with generalized weakness, bilateral chest pain and dizziness. CONTRAST USED:  62cc of Omnipaque 350  FINDINGS:  VASCULATURE:  No visible pulmonary arterial thrombus or attenuation.   THORACIC AORTA:  No aneurysm or v [facility]     Admission disposition: 12/3/2018  7:37 PM       My supervising physician was involved in the management of this patient. EKG, CBC, CMP, urinalysis, chest x-ray, troponin, d-dimer and slow fluids. Patient is well-appearing in the room.   No o

## 2018-12-03 NOTE — TELEPHONE ENCOUNTER
Doug Acosta from [facility] called. Mr. Katherine Maldonado fell yesterday and again today. He now is very weak , unable to stand and has mental status changes. They are not certain if he hit his head because it was an unwitnessed fall.  I advised Doug Acosta LPN to have patien

## 2018-12-04 NOTE — H&P
[facility]PITAL[facility]  History and Physical     Alvarado Elisha Patient Status:  Inpatient    1925 MRN [id number]   [facility] 4NW-A Attending Jin Teixeira [address] Day # 1 PCP Beverley Pate MD     Chief Complaint: [facility] MAIN OR   • OTHER SURGICAL HISTORY  2010    TURP   • TONSILLECTOMY      and adenoidectomy       Social History:  reports that he quit smoking about 48 years ago. His smoking use included cigarettes. He has a 20.00 pack-year smoking history.  he has never us (VITAMIN D3) 1000 UNITS Oral Cap Take 2 tablets by mouth daily. Disp:  Rfl:    aspirin 81 MG Oral Tab Take 81 mg by mouth daily. Disp:  Rfl:    Polyethyl Glycol-Propyl Glycol (SYSTANE OP) Apply 1 drop to eye as needed.  Disp:  Rfl:    Fexofenadine HCl (AL mL/min (based on SCr of 0.92 mg/dL). No results for input(s): PTP, INR in the last 168 hours. Recent Labs   Lab  12/03/18   1455   TROP  <0.046       Imaging: Imaging data reviewed in Epic. ASSESSMENT / PLAN:     1.  Progressive weakness and back

## 2018-12-04 NOTE — CONSULTS
Hematology-Oncology Consultation Note    Patient Name: Zeke Vergara   YOB: 1925   Medical Record Number: [id number]   CSN: [id number]   Consulting Physician: Awilda Braswell MD  Date of Consultation: 12/4/2018     Reason for Consultation: Carrie Alexander MD at [facility] MAIN OR   • OTHER SURGICAL HISTORY  2010    TURP   • TONSILLECTOMY      and adenoidectomy       Family Medical History:  Family History   Problem Relation Age of Onset   • Hypertension Father    • Heart Disorder Mother         chf tab 81 mg 81 mg Oral Daily   BuPROPion HCl ER (SR) (WELLBUTRIN SR) 12 hr tab 150 mg 150 mg Oral Daily   carbidopa-levodopa (SINEMET)  MG per tab 2 tablet 2 tablet Oral QID   lisinopril (PRINIVIL,ZESTRIL) tab 5 mg 5 mg Oral Daily   Meclizine HCl (ANTI chewable tab 8 tablet 8 tablet Oral Q15 Min PRN   Insulin Aspart Pen (NOVOLOG) 100 UNIT/ML flexpen 1-10 Units 1-10 Units Subcutaneous TID AC and HS   ibuprofen (MOTRIN) tab 400 mg 400 mg Oral TID   [COMPLETED] predniSONE (DELTASONE) tab 20 mg 20 mg Oral On 38.4 11/21/2013 0616    MCV 95.0 12/04/2018 0558    MCV 93.5 12/03/2018 1455    MCV 99.3 (H) 07/12/2018 0959    MEAN CELL VOLUME 95.3 01/07/2014 1333    MEAN CELL VOLUME 94.4 12/05/2013 1354    MEAN CELL VOLUME 93.7 11/21/2013 0616    MCH 31.3 12/04/2018 0 (H) 06/16/2014 1216    BUN 12 02/26/2014 1218    BUN 14 01/07/2014 1333    CREATININE 0.85 06/16/2014 1216    CREATININE 0.82 02/26/2014 1218    CREATININE 1.02 01/07/2014 1333    Creatinine 0.93 12/04/2018 0558    Creatinine 0.92 12/03/2018 1455    Creati 01/07/2014 1333    Alkaline Phosphatase 229 (H) 12/03/2018 1455    Alkaline Phosphatase 111 05/08/2018 1029    Alkaline Phosphatase 84 01/09/2018 1107    ALKALINE PHOSPHATASE 99 06/16/2014 1216    ALKALINE PHOSPHATASE 97 02/26/2014 1218    ALKALINE PHOSPHA Visualized portions of the mastoid air cells are unremarkable. Visualized portions of the orbits are unremarkable. IMPRESSION:  Sequelae of chronic small vessel ischemic disease is noted.  No evidence of intracranial hemorrhage or extra-axial fluid kosta lucent metastatic deposits with bony destruction in the thoracic spine is noted.            Dictated by: Randi Leung MD on 12/03/2018 at 18:33       Approved by: Randi Leung MD             Impression & Plan:   I reviewed his imaging with him i

## 2018-12-04 NOTE — PHYSICAL THERAPY NOTE
Attempted to see patient for PT evaluation this AM.  Patient pleasantly refusing despite gentle encouragement due to fatigue and awaiting further testing this date.   RN aware will reattempt PT services as able

## 2018-12-04 NOTE — DIETARY MALNUTRITION NOTE
[facility]    NUTRITION INITIAL ASSESSMENT    Pt meets severe malnutrition criteria.     CRITERIA FOR MALNUTRITION DIAGNOSIS:  Criteria for severe malnutrition diagnosis: chronic illness related to wt loss greater than 5% in 1 month, wt loss greater th ambulate independently with a walker but for the past 1-2 days had more difficulty walking. ANTHROPOMETRICS:  Ht: 180.3 cm (5' 11\")  Wt: 69.9 kg (154 lb) . This is 89 % of IBW  BMI:Body mass index is 21.48 kg/m².  .  IBW: 78 kg  Usual Body Wt: 76-78

## 2018-12-04 NOTE — PROGRESS NOTES
NURSING ADMISSION NOTE      Patient admitted via Cart  Oriented to room. Safety precautions initiated. Bed in low position. Call light in reach. Pt arrived to room, alert and oriented with periods of confusion. VSS.  Admission navigator completed

## 2018-12-04 NOTE — RESPIRATORY THERAPY NOTE
LINDSAY - Equipment Use Daily Summary:                  . Set Mode:                . Usage in hours:                . 90% Pressure (EPAP) level:                . 90% Insp. Pressure (IPAP): Slater August AHI:                .  Supplemental Oxygen:    LPM

## 2018-12-04 NOTE — PLAN OF CARE
No void overnight, bladder scan was 287 at 530 am , in no distress. Iv fluids continued and oral fluids encouraged.  Max assist to stand , encouraged use of urinal

## 2018-12-04 NOTE — PLAN OF CARE
Patient/Family Goals    • Patient/Family Short Term Goal Progressing        Currently lives at [facility] . Recent frequent falls , hx Parkinson's disease, presents w/ weakness and new back pain .  Completed CT abdomen , and Nuclear Med bone scan , recvd po

## 2018-12-04 NOTE — ED NOTES
Bedside report received from [facility].  Pt resting, daughter at bedside, updated of the POC

## 2018-12-04 NOTE — PROGRESS NOTES
[facility]  Progress note     Juanita Ontiveros Patient Status:  Inpatient    1925 MRN [id number]   [facility] 4NW-A Attending Wilder Ackerman [address] Day # 1 PCP Katlyn Mattson MD     Chief Complaint: weakness and prednisone  2. Parkinson's-we will continue on his carbidopa levodopa. 3. Coronary artery disease-no active issues will continue on aspirin, statin, Toprol. 4. Hyperlipidemia-we will continue statin therapy. 5. GERD-we will continue on PPI.   6. Type 2 d

## 2018-12-05 NOTE — PLAN OF CARE
MUSCULOSKELETAL - ADULT    • Return mobility to safest level of function Not Progressing          CARDIOVASCULAR - ADULT    • Absence of cardiac arrhythmias or at baseline Progressing        GASTROINTESTINAL - ADULT    • Maintains or returns to baseline shauna

## 2018-12-05 NOTE — PHYSICAL THERAPY NOTE
PHYSICAL THERAPY EVALUATION - INPATIENT     Room Number: 418/418-A  Evaluation Date: 12/5/2018  Type of Evaluation: Initial  Physician Order: PT Eval and Treat    Presenting Problem: back pain  Reason for Therapy: Mobility Dysfunction and Discharge P Weakness generalized    Abnormal finding on CT scan      Past Medical History  Past Medical History:   Diagnosis Date   • Benign positional vertigo    • BPH (benign prostatic hypertrophy)    • CAD (coronary artery disease)    • Constipation    • Dry eye commands with repetition  · Safety Judgement:  decreased awareness of need for assistance  · Problem Solving:  assistance required to identify errors made, assistance required to generate solutions and assistance required to implement solutions    RANGE OF occasional LOB posterior- able to self correct. Sit to stand at eob with RW with mod A of 1. Upon stance, pt reporting feeling of needing to void. Commode set up next to pt. Sit to/from stand again performed with mod A.   Pt unable to advance either LE RECOMMENDATIONS  PT Discharge Recommendations: Sub-acute rehabilitation(ELOS 12-14 days)    PLAN  PT Treatment Plan: Body mechanics; Bed mobility; Endurance; Energy conservation;Patient education;Gait training;Family education;Range of motion;Stoop training;S

## 2018-12-05 NOTE — PROGRESS NOTES
[facility]  Progress note     Zeke Parents Patient Status:  Inpatient    1925 MRN [id number]   [facility] 4NW-A Attending [facility] Day # 2 PCP Gallo Dunlap MD     Chief Complaint: weakness and scapula, and sacrum  2. SPEP pending  3. Oncology following   2. Parkinson's disease  1. Continue carbidopa levodopa. 3. Coronary artery disease  1. ASA/statin/BB  4. Hyperlipidemia  1. Statin   5. GERD  1. PPI  6. Type 2 diabetes  1. SSI  7.  Depression

## 2018-12-05 NOTE — CM/SW NOTE
12/05/18 1500   CM/SW Referral Data   Referral Source    Reason for Referral Discharge planning   Informant Patient; Children   Pertinent Medical Hx   Primary Care Physician Name Dr Belinda Hicks   Date of Last Contact with PCP 11/2018   Sergio

## 2018-12-05 NOTE — PROGRESS NOTES
Heme/Onc Progress Note    Patient Name: Juanita Ontiveros   YOB: 1925   Medical Record Number: [id number]   CSN: [id number]   Attending Physician: Obinna Donovan M.D.      Subjective:  Feeling better on steroids    Objective:    Vitals:   12/05 tab 650 mg 650 mg Oral Q6H PRN   acetaminophen (TYLENOL) tab 650 mg 650 mg Oral Q4H PRN   Or      HYDROcodone-acetaminophen (NORCO) 5-325 MG per tab 1 tablet 1 tablet Oral Q4H PRN   Or      HYDROcodone-acetaminophen (NORCO) 5-325 MG per tab 2 tablet 2 tabl 15 mg 15 mg Intravenous Once       Physical Examination:  General: Patient is alert and oriented, not in acute distress. HEENT: EOMs intact. PERRL. Oropharynx is clear. Neck: No JVD. No palpable lymphadenopathy. Neck is supple.   Chest: Clear to ausculta within the spine, ribs, scapula, and sacrum. Metastatic disease with destructive lesions within the mid thoracic spine causing compression deformity of the T6 vertebral body and there is extension of the pedicles   within multiple of these levels.   Adam Sees calcification. No significant parenchymal lesion. ADRENALS:    Mild micro nodularity of the head of the left adrenal gland to be of no significance. Right adrenal normal.    AORTA/VASCULAR:  Fusiform abdominal aortic aneurysm measuring about 3.3 cm. by: Mei Zelaya MD         Impression and Plan:  Bone lesions- extensive bony involvement suspicious for metastases.  No obvious primary seen on scans though there was RLL atelectasis and consolidation noted in CT of chest. Cannot rule out underlying mas

## 2018-12-05 NOTE — PLAN OF CARE
GENITOURINARY - ADULT    • Absence of urinary retention Not Progressing        Impaired Functional Mobility    • Achieve highest/safest level of mobility/gait Not Progressing          CARDIOVASCULAR - ADULT    • Absence of cardiac arrhythmias or at baselin

## 2018-12-06 NOTE — RESPIRATORY THERAPY NOTE
LINDSAY Equipment Usage Summary :            Set Mode : CPAP           Usage in Hours:5;38          90% Pressure (EPAP) : 8           90% Insp Pressure (IPAP);           AHI : 4.4          Supplemental Oxygen :   4   LPM

## 2018-12-06 NOTE — PLAN OF CARE
CARDIOVASCULAR - ADULT    • Absence of cardiac arrhythmias or at baseline Progressing        GASTROINTESTINAL - ADULT    • Maintains or returns to baseline bowel function Progressing        GENITOURINARY - ADULT    • Absence of urinary retention Progressin

## 2018-12-06 NOTE — PHYSICAL THERAPY NOTE
Spoke to OT, stated that pt does not want PT to come and see him at this time. Per OT, pt was told that his CA has been spreading to his spine. Will respect pt's refusal. Will follow up tomorrow.

## 2018-12-06 NOTE — PROGRESS NOTES
[facility]  Progress note     Ángel Dipak Patient Status:  Inpatient    1925 MRN [id number]   [facility] 4NW-A Attending Michelle Lo[place]  [place] Day # 3 PCP Christiana Pinedo MD     Chief Complaint: weakness and and sacrum  2. SPEP pending  3. Oncology following   4. Family leaning towards palliative care v hospice per oncology notes   2. Parkinson's disease  1. Continue carbidopa levodopa. 3. Coronary artery disease  1. ASA/statin/BB  4. Hyperlipidemia  1.  Stati

## 2018-12-06 NOTE — CDS QUERY
Potential for Impaired Nutritional Status  DOCUMENTATION CLARIFICATION FORM  Dear Doctor Alla Gil information suggests potential for impaired nutritional status.  For accurate ICD-10-CM code assignment to reflect severity of illness and risk of mo

## 2018-12-06 NOTE — CM/SW NOTE
Spoke with daughter Johnny Mead regarding discharging planning. Johnny Boston stating that she is overwhelmed with choices that need to be made. Johnny Mead asked for referral to [facility] and [facility]. Referrals sent via [address] [place] [address] Drive.  Johnny Mead stating that [facility] was to send re

## 2018-12-06 NOTE — OCCUPATIONAL THERAPY NOTE
Attempted to see pt this PM, pt refusing at this time stating he just cannot do it today, OT will follow up tomorrow.

## 2018-12-06 NOTE — PROGRESS NOTES
Assumed care at 2300. Sleeping on and off, CPAP initiated re: LINDSAY, patient alert but  Little confused at night, reoriented  With success, been urinating without difficulty, bladder  Scanned- shows 19 ml. Had only 1 dose of norco this shift.  Appeared comfor

## 2018-12-06 NOTE — PROGRESS NOTES
Patient alert and oriented x3-4 on room air maintaining saturation >90%. Patient reports generalized back pain, rating it at a 5-6 on 1-10 pain scale. Due medications given whole in applesauce, IVF infusing.  Patient voiding per urinal, repositions self wel

## 2018-12-06 NOTE — PROGRESS NOTES
Heme/Onc Progress Note    Patient Name: Sandoval Amezcua   YOB: 1925   Medical Record Number: [id number]   CSN: [id number]   Attending Physician: Torie Borrero M.D. Subjective:  Still weak. Slow to respond but clearheaded.   Pain is over Intravenous, Q2H PRN **OR** morphINE sulfate (PF) 4 MG/ML injection 4 mg, 4 mg, Intravenous, Q2H PRN  •  docusate sodium (COLACE) cap 100 mg, 100 mg, Oral, BID  •  PEG 3350 (MIRALAX) powder packet 17 g, 17 g, Oral, Daily PRN  •  magnesium hydroxide (MILK O 11:57 AM   Result Value Ref Range    POC Glucose 127 (H) 65 - 99 mg/dL   POCT GLUCOSE    Collection Time: 12/05/18  3:37 PM   Result Value Ref Range    POC Glucose 135 (H) 65 - 99 mg/dL   POCT GLUCOSE    Collection Time: 12/05/18 10:08 PM   Result Value Re

## 2018-12-07 NOTE — CM/SW NOTE
SW spoke w/Neha from [place] who stated they are able to accept the pt.  Daughter Soledad Rodriguez to tour at 1:00pm.

## 2018-12-07 NOTE — DIETARY NOTE
[facility]    NUTRITION FOLLOW UP ASSESSMENT    Pt meets severe malnutrition criteria.     CRITERIA FOR MALNUTRITION DIAGNOSIS:  Criteria for severe malnutrition diagnosis: chronic illness related to wt loss greater than 5% in 1 month, wt loss greater and adequate nutrition. Pt says he likes magic cup, will continue to send. 12/2-pt reports decreased appetite and po intake, says he has not been eating well, reports he has some nausea today.  Pt with 18 lbs wt loss over the past 5 months, 9 lbs loss i 75% patient nutrition prescription  2. At least 75% intake of oral supplements  3. No signs of skin breakdown  4.  Maintain lean body mass    MEDICATIONS:  Noted    LABS:  Noted    Pt is at moderate nutrition risk    FOLLOW-UP DATE:  12/12/18    Asher Ferrer

## 2018-12-07 NOTE — PLAN OF CARE
12/6 2000: Pt received AOx2, forgetful. [place]. Room air. SR. IVF NS at 100 cc/hr. SCD. Denies pain. 12/7 0100: 2L NC. [place] for back pain-satisfied. Voiding. Incontinent at times. 0530: CPAP-tolerating. SR. Bladder scan showed 330cc.       CARDIOVASCULAR -

## 2018-12-07 NOTE — PROGRESS NOTES
Heme/Onc Progress Note    Patient Name: Gage Grubbs   YOB: 1925   Medical Record Number: [id number]   CSN: [id number]   Attending Physician: Lovely Dalal M.D. Subjective:  Mild confusion at times - otherwise is clearheaded.   Lisette 1 mg, Intravenous, Q2H PRN **OR** morphINE sulfate (PF) 4 MG/ML injection 2 mg, 2 mg, Intravenous, Q2H PRN **OR** morphINE sulfate (PF) 4 MG/ML injection 4 mg, 4 mg, Intravenous, Q2H PRN  •  docusate sodium (COLACE) cap 100 mg, 100 mg, Oral, BID  •  PEG 33 regions. Psych/Depression:coping despite the news.     Labs:  Recent Results (from the past 24 hour(s))   POCT GLUCOSE    Collection Time: 12/06/18 12:08 PM   Result Value Ref Range    POC Glucose 119 (H) 65 - 99 mg/dL   POCT GLUCOSE    Collection Time: 12 [address]  [address] [facility], R Ruchi Navarrete

## 2018-12-07 NOTE — CM/SW NOTE
DANIEL spoke w/Myrna at [facility] who stated the pt is not able to return to the facility since they will not be able to meet his needs at this time. Milagro Torres stated they have already informed the pt's daughter Soledad Rodriguez of this. DANIEL spoke w/Oksana.  Referrals were

## 2018-12-07 NOTE — PROGRESS NOTES
[facility]  Progress note     Arvin Ashley Patient Status:  Inpatient    1925 MRN [id number]   [facility] 4NW-A Attending [facility] Day # 4 PCP Keyanna Hernandez MD     Chief Complaint: weakness and scapula, and sacrum  2. Oncology following   3. Family leaning towards [facility] and possible eventual transition to hospice  2. Parkinson's disease  1. Continue carbidopa levodopa. 3. Coronary artery disease  1. ASA/statin/BB  4. Hyperlipidemia  1. Statin   5.

## 2018-12-07 NOTE — RESPIRATORY THERAPY NOTE
LINDSAY - Equipment Use Daily Summary:                  . Set Mode: CPAP                . Usage in hours: 6:45                . 90% Pressure (EPAP) level: 8                . 90% Insp. Pressure (IPAP): Graciela Pencil AHI: 4.0                .  Supplemental Oxyg

## 2018-12-08 NOTE — PROGRESS NOTES
[facility]  Progress Note    Alvarado Tello Patient Status:  Inpatient    1925 MRN [id number]   [facility] 4NW-A Attending Gera Haines,    Hosp Day # 5 PCP Beverley Pate MD       SUBJECTIVE:    Awake, alert, denies compl HYDROcodone-acetaminophen **OR** HYDROcodone-acetaminophen, morphINE sulfate **OR** morphINE sulfate **OR** morphINE sulfate, PEG 3350, magnesium hydroxide, bisacodyl, FLEET ENEMA, ondansetron HCl, Metoclopramide HCl, glucose **OR** Glucose-Vitamin C **OR*

## 2018-12-08 NOTE — PROGRESS NOTES
NURSING DISCHARGE NOTE    Discharged Nursing home via Ambulance. Accompanied by Family member  Belongings Taken by patient/family.

## 2018-12-08 NOTE — PROGRESS NOTES
[facility]  Progress note     Hui Davidson Patient Status:  Inpatient    1925 MRN [id number]   [facility] Serina Jane MD     Chief Complaint: weakness and sacrum  2. Oncology following   3. Family leaning towards [facility] and possible eventual transition to hospice  2. Parkinson's disease  1. Continue carbidopa levodopa. 3. Coronary artery disease  1. ASA/statin/BB  4. Hyperlipidemia  1. Statin   5. GERD  1.  PPI

## 2018-12-08 NOTE — CM/SW NOTE
CM informed that patient is ready for discharge today. [facility] set up for 2:30PM. CM spoke with daughter Hai Shabazz who states that her sister will be at bedside and she will meet her father at [facility]. RN to call report to .     Bianca Gandara

## 2018-12-08 NOTE — RESPIRATORY THERAPY NOTE
LINDSAY Equipment Usage Summary :            Set Mode :BIPAP W FLEX          Usage in Hours:2;39          90% Pressure (EPAP) : 8          90% Insp Pressure (IPAP);8          AHI : 4          Supplemental Oxygen :    2LPM

## 2018-12-08 NOTE — PLAN OF CARE
GENITOURINARY - ADULT    • Absence of urinary retention Progressing          PAIN - ADULT    • Verbalizes/displays adequate comfort level or patient's stated pain goal Progressing          SAFETY ADULT - FALL    • Free from fall injury Progressing

## 2018-12-08 NOTE — DISCHARGE SUMMARY
[facility] [place] [facility]OSPITALIST  DISCHARGE SUMMARY     Sabina Harrison Patient Status:  Inpatient    1925 MRN [id number]   [facility] 4NW-A Attending Nelson Sahu, [address] [facility] Day [facility] 5 PCP Halie Gamble MD     Date of Admission: 12/3/2018  Date discharge from the hospital.    Procedures during hospitalization:   • None    Incidental or significant findings and recommendations (brief descriptions):  • None    Lab/Test results pending at Discharge:   · None    Consultants:  • Oncology     Discharge 180 tablet  Refills:  0     MIRALAX Pack  Generic drug:  Polyethylene Glycol 3350      Take 17 g by mouth daily. Refills:  0     mirtazapine 15 MG Tabs  Commonly known as:  REMERON      Take 15 mg by mouth nightly.    Refills:  0     Omeprazole 40 MG Cpdr

## 2018-12-13 NOTE — TELEPHONE ENCOUNTER
Daughter called stating pt was discharged from [facility] on Saturday and is now at [facility]. Pt will be under the care of Dr Thai Shelley. Daughter concerned that they don't seem to be giving him his drugs.  It doesn't appear pt is on any current t

## 2018-12-13 NOTE — TELEPHONE ENCOUNTER
Daughter concerned that her dad is having a lot of pain. Reviewed notes and explained they were titrating up his Tramadol. Let her know I would touch base with NP at [facility] about starting a [place] pain medication.

## 2018-12-21 NOTE — TELEPHONE ENCOUNTER
Message came from the family. Patient passed away. Please update chart and cancel future appointments.

## 2019-05-09 NOTE — TELEPHONE ENCOUNTER
Report received from MARIAELENA Madera.  Pt is lying in bed with eyes closed, appears to be resting comfortably, NADN.  Security bedside.  Safe environment maintained.   Spoke with pt and he states that he has had left shoulder pain x 3 weeks and since yesterday, the right one started. Pt denies any SOB, just pain with his shoulders. Appt made with Dr. Vipin Khan for tomorrow.

## (undated) NOTE — MR AVS SNAPSHOT
[facility], [address] [id number]               Thank you for choosing us for your health care visit with Serina Jane MD.  We are glad to serve you and happy to provide you with this blackburn Lab Results           Medical Issues Discussed Today     Diabetes mellitus type 2 with neurological manifestations    Hyperlipidemia    Idiopathic gout    Encounter for annual health examination    -  Primary    B12 deficiency        Vitamin D deficiency EKG - covered if needed at Welcome to Medicare, and non-screening if indicated for medical reasons    Electrocardiogram date Routine EKG is not a screening covered service except at the [place] to Medicare Visit    Abdominal aortic aneurysm screening (onc Please get once after your 65th birthday    Pneumococcal 23 (Pneumovax)  Covered Once after 65 No orders found for this or any previous visit.  Please get once after your 65th birthday    Hepatitis B for Moderate/High Risk No orders found for this or any p Current Medications          This list is accurate as of: 3/22/17  1:49 PM.  Always use your most recent med list.                [facility] OR   Take by mouth.            allopurinol 100 MG Tabs   TAKE 1 TABLET BY MOUTH DAILY   Commonly known as:  [facility] office, you can view your past visit information in [facility] by going to Visits < Visit Summaries. [facility] questions? Call [phone] for help. [facility] is NOT to be used for urgent needs. For medical emergencies, dial 911.         Educational Inform